# Patient Record
Sex: FEMALE | Race: WHITE | NOT HISPANIC OR LATINO | Employment: FULL TIME | URBAN - METROPOLITAN AREA
[De-identification: names, ages, dates, MRNs, and addresses within clinical notes are randomized per-mention and may not be internally consistent; named-entity substitution may affect disease eponyms.]

---

## 2021-11-03 ENCOUNTER — TELEPHONE (OUTPATIENT)
Dept: OBSTETRICS AND GYNECOLOGY | Facility: CLINIC | Age: 56
End: 2021-11-03
Payer: COMMERCIAL

## 2021-11-30 ENCOUNTER — OFFICE VISIT (OUTPATIENT)
Dept: OBSTETRICS AND GYNECOLOGY | Facility: CLINIC | Age: 56
End: 2021-11-30
Payer: COMMERCIAL

## 2021-11-30 VITALS
BODY MASS INDEX: 32 KG/M2 | SYSTOLIC BLOOD PRESSURE: 142 MMHG | HEART RATE: 80 BPM | HEIGHT: 66 IN | WEIGHT: 199.13 LBS | DIASTOLIC BLOOD PRESSURE: 86 MMHG

## 2021-11-30 DIAGNOSIS — Z01.419 ENCOUNTER FOR ANNUAL ROUTINE GYNECOLOGICAL EXAMINATION: Primary | ICD-10-CM

## 2021-11-30 DIAGNOSIS — N95.1 HOT FLASHES DUE TO MENOPAUSE: ICD-10-CM

## 2021-11-30 DIAGNOSIS — E34.9 HORMONE DEFICIENCY: ICD-10-CM

## 2021-11-30 DIAGNOSIS — N95.2 ATROPHIC VAGINITIS: ICD-10-CM

## 2021-11-30 PROCEDURE — 99386 PREV VISIT NEW AGE 40-64: CPT | Mod: S$GLB,,, | Performed by: NURSE PRACTITIONER

## 2021-11-30 PROCEDURE — 99386 PR PREVENTIVE VISIT,NEW,40-64: ICD-10-PCS | Mod: S$GLB,,, | Performed by: NURSE PRACTITIONER

## 2021-11-30 RX ORDER — MONTELUKAST SODIUM 10 MG/1
10 TABLET ORAL NIGHTLY
COMMUNITY
Start: 2021-10-27

## 2021-11-30 RX ORDER — ERGOCALCIFEROL 1.25 MG/1
50000 CAPSULE ORAL
COMMUNITY
Start: 2021-11-01

## 2021-11-30 RX ORDER — FLUTICASONE PROPIONATE 50 MCG
2 SPRAY, SUSPENSION (ML) NASAL DAILY
COMMUNITY
Start: 2021-09-17

## 2021-11-30 RX ORDER — TRAZODONE HYDROCHLORIDE 100 MG/1
100 TABLET ORAL NIGHTLY PRN
COMMUNITY
Start: 2021-11-01

## 2021-11-30 RX ORDER — CYCLOBENZAPRINE HCL 10 MG
10 TABLET ORAL NIGHTLY
COMMUNITY
Start: 2021-11-18

## 2022-02-28 ENCOUNTER — OFFICE VISIT (OUTPATIENT)
Dept: OBSTETRICS AND GYNECOLOGY | Facility: CLINIC | Age: 57
End: 2022-02-28
Payer: COMMERCIAL

## 2022-02-28 VITALS
BODY MASS INDEX: 33.17 KG/M2 | HEART RATE: 99 BPM | HEIGHT: 66 IN | WEIGHT: 206.38 LBS | DIASTOLIC BLOOD PRESSURE: 87 MMHG | SYSTOLIC BLOOD PRESSURE: 155 MMHG

## 2022-02-28 DIAGNOSIS — E34.9 HORMONE DEFICIENCY: Primary | ICD-10-CM

## 2022-02-28 DIAGNOSIS — K21.00 GASTROESOPHAGEAL REFLUX DISEASE WITH ESOPHAGITIS WITHOUT HEMORRHAGE: ICD-10-CM

## 2022-02-28 PROCEDURE — 3077F PR MOST RECENT SYSTOLIC BLOOD PRESSURE >= 140 MM HG: ICD-10-PCS | Mod: CPTII,S$GLB,, | Performed by: NURSE PRACTITIONER

## 2022-02-28 PROCEDURE — 99213 OFFICE O/P EST LOW 20 MIN: CPT | Mod: S$GLB,,, | Performed by: NURSE PRACTITIONER

## 2022-02-28 PROCEDURE — 1160F PR REVIEW ALL MEDS BY PRESCRIBER/CLIN PHARMACIST DOCUMENTED: ICD-10-PCS | Mod: CPTII,S$GLB,, | Performed by: NURSE PRACTITIONER

## 2022-02-28 PROCEDURE — 3079F DIAST BP 80-89 MM HG: CPT | Mod: CPTII,S$GLB,, | Performed by: NURSE PRACTITIONER

## 2022-02-28 PROCEDURE — 1160F RVW MEDS BY RX/DR IN RCRD: CPT | Mod: CPTII,S$GLB,, | Performed by: NURSE PRACTITIONER

## 2022-02-28 PROCEDURE — 3008F PR BODY MASS INDEX (BMI) DOCUMENTED: ICD-10-PCS | Mod: CPTII,S$GLB,, | Performed by: NURSE PRACTITIONER

## 2022-02-28 PROCEDURE — 3008F BODY MASS INDEX DOCD: CPT | Mod: CPTII,S$GLB,, | Performed by: NURSE PRACTITIONER

## 2022-02-28 PROCEDURE — 1159F PR MEDICATION LIST DOCUMENTED IN MEDICAL RECORD: ICD-10-PCS | Mod: CPTII,S$GLB,, | Performed by: NURSE PRACTITIONER

## 2022-02-28 PROCEDURE — 3077F SYST BP >= 140 MM HG: CPT | Mod: CPTII,S$GLB,, | Performed by: NURSE PRACTITIONER

## 2022-02-28 PROCEDURE — 3079F PR MOST RECENT DIASTOLIC BLOOD PRESSURE 80-89 MM HG: ICD-10-PCS | Mod: CPTII,S$GLB,, | Performed by: NURSE PRACTITIONER

## 2022-02-28 PROCEDURE — 99213 PR OFFICE/OUTPT VISIT, EST, LEVL III, 20-29 MIN: ICD-10-PCS | Mod: S$GLB,,, | Performed by: NURSE PRACTITIONER

## 2022-02-28 PROCEDURE — 1159F MED LIST DOCD IN RCRD: CPT | Mod: CPTII,S$GLB,, | Performed by: NURSE PRACTITIONER

## 2022-02-28 RX ORDER — SUCRALFATE 1 G/1
TABLET ORAL DAILY PRN
COMMUNITY
Start: 2022-02-10

## 2022-02-28 RX ORDER — ALBUTEROL SULFATE 90 UG/1
AEROSOL, METERED RESPIRATORY (INHALATION)
COMMUNITY
Start: 2022-01-21

## 2022-02-28 RX ORDER — SOD SULF/POT CHLORIDE/MAG SULF 1.479 G
TABLET ORAL
COMMUNITY
Start: 2021-11-03 | End: 2022-11-30

## 2022-02-28 RX ORDER — OMEPRAZOLE 40 MG/1
40 CAPSULE, DELAYED RELEASE ORAL DAILY
COMMUNITY
Start: 2022-02-10

## 2022-02-28 RX ORDER — ESTRADIOL 0.5 MG/1
0.5 TABLET ORAL DAILY
Qty: 30 TABLET | Refills: 11 | Status: SHIPPED | OUTPATIENT
Start: 2022-02-28 | End: 2023-02-28

## 2022-02-28 NOTE — PROGRESS NOTES
"  Subjective:       Patient ID: Licha Gonzales is a 56 y.o. female.    Chief Complaint:  Follow-up      History of Present Illness  Pt here for follow up on HRT. Pt did have a MMG at [facility] that required BX but was benign.   History and past labs reviewed with patient.    Complaints: Feeling better with the hot flashes and sleeping much better.       Review of Systems  Review of Systems   Genitourinary: Positive for decreased libido and hot flashes.   Neurological: Negative for headaches.           Objective:     Vitals:    02/28/22 0916   BP: (!) 155/87   Pulse: 99   Weight: 93.6 kg (206 lb 6.4 oz)   Height: 5' 6" (1.676 m)        Physical Exam:   Constitutional: She is oriented to person, place, and time.               Genitourinary:    Genitourinary Comments: Locate MMG and BX results for chart, change to estrace due to cost.                  Neurological: She is oriented to person, place, and time.     Psychiatric: She has a normal mood and affect. Her behavior is normal. Thought content normal.           Assessment:     1. Hormone deficiency    2. Gastroesophageal reflux disease with esophagitis without hemorrhage              Plan:       Hormone deficiency  -     estradioL (ESTRACE) 0.5 MG tablet; Take 1 tablet (0.5 mg total) by mouth once daily.  Dispense: 30 tablet; Refill: 11    Gastroesophageal reflux disease with esophagitis without hemorrhage         Follow up in about 9 months (around 11/28/2022).     "

## 2022-09-06 ENCOUNTER — TELEPHONE (OUTPATIENT)
Dept: ORTHOPEDICS | Facility: CLINIC | Age: 57
End: 2022-09-06
Payer: COMMERCIAL

## 2022-09-06 NOTE — TELEPHONE ENCOUNTER
Attempted to reach patient regarding program - no answer; detailed voicemail with call back number left for patient

## 2022-09-09 ENCOUNTER — TELEPHONE (OUTPATIENT)
Dept: ORTHOPEDICS | Facility: CLINIC | Age: 57
End: 2022-09-09
Payer: COMMERCIAL

## 2022-09-09 NOTE — TELEPHONE ENCOUNTER
----- Message from Shabnam Casas sent at 9/9/2022  2:40 PM CDT -----  Regarding: Follow-up on Initial Call  Brittany,     Spoke with Ms. Licha Gonzales, She has not had a dental cleaning in 4 years. I asked her to make a dental appointment to have her teeth cleaned and have the records faxed to us. I also asked if she has any skin issues. Ms. Gonzales states that her skin does get irritated with new products sometimes and she has to use Tide free, free of any dyes. Ms Gonzales also stated that she will schedule a dental cleaning.    -Patient

## 2022-10-20 ENCOUNTER — TELEPHONE (OUTPATIENT)
Dept: ORTHOPEDICS | Facility: CLINIC | Age: 57
End: 2022-10-20
Payer: COMMERCIAL

## 2022-11-08 ENCOUNTER — TELEPHONE (OUTPATIENT)
Dept: ORTHOPEDICS | Facility: CLINIC | Age: 57
End: 2022-11-08
Payer: COMMERCIAL

## 2022-11-08 NOTE — TELEPHONE ENCOUNTER
Patient left voicemail about combo fax being sent to PCP - it was sent on 10/20  Attempted to call their office to assure they had it; had to leave vcm   Emailed a copy of letter to patient     Updated patient with all of this information

## 2022-11-16 ENCOUNTER — TELEPHONE (OUTPATIENT)
Dept: ORTHOPEDICS | Facility: CLINIC | Age: 57
End: 2022-11-16
Payer: COMMERCIAL

## 2022-11-16 NOTE — TELEPHONE ENCOUNTER
Attempted to reach patient for status update   Rec'd office visit note and ekg from [facility]; did not send labs - those have been requested  On office visit note states she's still using nicotine - need to confirm if this is correct  Did get info from dentist, however, it is a billing ledger - unsure if tx is complete  Pt wasn't available and I left some info on vcm; requested she  call me back this evening

## 2022-11-22 ENCOUNTER — TELEPHONE (OUTPATIENT)
Dept: ORTHOPEDICS | Facility: CLINIC | Age: 57
End: 2022-11-22
Payer: COMMERCIAL

## 2022-11-22 NOTE — TELEPHONE ENCOUNTER
Patient called for status update   Still haven't gotten records from [facility].    Quite date for nicotine is 9/1  Dental treatment completed

## 2022-11-30 ENCOUNTER — TELEPHONE (OUTPATIENT)
Dept: ORTHOPEDICS | Facility: CLINIC | Age: 57
End: 2022-11-30
Payer: COMMERCIAL

## 2022-11-30 RX ORDER — ACETAMINOPHEN AND PHENYLEPHRINE HCL 325; 5 MG/1; MG/1
TABLET ORAL DAILY
COMMUNITY

## 2022-11-30 NOTE — TELEPHONE ENCOUNTER
Spoke to patient on: 11/30/22    Surgeon: Ho    Surgery: left Knee    Travel caregiver:  Ceasar     BMI: 33.4    Social Hx:     -  Nicotine no   - Alcohol no   - Other substances Rx marijuana              Patient works at [employer]            Plan to return home after surgery:  yes            Have support to help with care and appointments: yes     Allergies: udated in epic    Medication list: updated in epic    Covid-19:   - vaccine: yes   - diagnosed with: no        Health Hx: updated in epic    Surgical Hx: updated in epic    Can you take one flight of stairs: yes    RCRI:   - Coronary Artery Disease: no   - Congestive Heart Failure: no   - Cerebrovascular Disease: no   - Diabetes Mellitus on insulin: no      ROS:   - Fever/chills: no   - Infection: no   - Cough/Resp Issues:  no   - Bleeding/blood loss (vaginal, rectal, vomiting, urination):  no   - GI issues/Acid Reflux/Vomiting: yes   - Fatty liver: no   - UTI: no   - MRSA colonization: no   - Constipation:  no   - Recent steroid treatment: no   - Strokes or Passing out: no   - Blood Thinners or ASA: no   - Blood clot in heart/lung/extremity: no   - Stents: no   - Depression or Anxiety: no   - Skin issues (rashes, blisters, boils, bumps): yes   - Problems with anesthesia:  no   - If female, having periods/pregnant: No  - Vision problems: yes  - Dental exam recently: yes    STOPBANG   - S  snore loudly no   - T  tired during the day no   - O  stop breathing in sleep no   - P  BP/HTN no   - B  BMI    - A  over 50 yes   - N  neck size   - G  male gender no    Family Hx:    - Anesthesia problems: no   - Heart problems yes   - DVT  no

## 2022-12-12 ENCOUNTER — DOCUMENTATION ONLY (OUTPATIENT)
Dept: INTERNAL MEDICINE | Facility: CLINIC | Age: 57
End: 2022-12-12
Payer: COMMERCIAL

## 2022-12-12 DIAGNOSIS — Z79.890 HORMONE REPLACEMENT THERAPY (HRT): ICD-10-CM

## 2022-12-12 DIAGNOSIS — R76.8 ANA POSITIVE: ICD-10-CM

## 2022-12-12 DIAGNOSIS — Z87.891 FORMER SMOKER: ICD-10-CM

## 2022-12-12 DIAGNOSIS — R73.03 PREDIABETES: ICD-10-CM

## 2022-12-12 DIAGNOSIS — E66.9 OBESITY, UNSPECIFIED CLASSIFICATION, UNSPECIFIED OBESITY TYPE, UNSPECIFIED WHETHER SERIOUS COMORBIDITY PRESENT: ICD-10-CM

## 2022-12-12 DIAGNOSIS — Z01.89 LABORATORY TEST: ICD-10-CM

## 2022-12-12 DIAGNOSIS — K21.00 GASTROESOPHAGEAL REFLUX DISEASE WITH ESOPHAGITIS WITHOUT HEMORRHAGE: ICD-10-CM

## 2022-12-12 NOTE — PROGRESS NOTES
[facility]  Progress Note    Patient Name: Licha Gonzales  MRN: [id number]  Date of Evaluation- 12/12/2022  PCP- Clint Lindsey MD        HPI:    Assessment/Plan:     Former smoker  Quite date for nicotine is 9/1    Obesity  Weight related conditions         Acid reflux   Osteoarthritis  Prediabetes         Hormone replacement therapy (HRT)  On hormone replacement therapy   I  suggest reaching out to her prescribing MD with regards to holding hormone replacement therapy in the perioperative time due to risk of thrombosis    Laboratory test                      PLT-N    EKG personally reviewed by me showed no acute appearing changes    Prediabetes  Increase mobility after joint replacement surgery will likely help with the weight loss and  the prediabetes    Gastroesophageal reflux disease with esophagitis without hemorrhage      WAYNE positive  Had Work up through PCP  Rheumatoid factor within the reference range       Shannon Bird MD  Internal Medicine  [facility]   Cell Phone- [phone]    Once her blood pressure is acceptable, will give  the approval for surgery

## 2022-12-12 NOTE — ASSESSMENT & PLAN NOTE
Increase mobility after joint replacement surgery will likely help with the weight loss and  the prediabetes

## 2022-12-12 NOTE — HPI
Chart reviewed     Female , age 57 Y    Health conditions of significance for the perioperative period      - GERD  - Former smoker   - Obesity   - HRT

## 2022-12-12 NOTE — ASSESSMENT & PLAN NOTE
On hormone replacement therapy   I  suggest reaching out to her prescribing MD with regards to holding hormone replacement therapy in the perioperative time due to risk of thrombosis

## 2022-12-28 ENCOUNTER — TELEPHONE (OUTPATIENT)
Dept: ORTHOPEDICS | Facility: CLINIC | Age: 57
End: 2022-12-28
Payer: COMMERCIAL

## 2022-12-28 NOTE — TELEPHONE ENCOUNTER
Called pt on 12/21/22 with questions from Dr. Bird:    How is  she doing with her skin ? No issues   Any open areas on the expected surgical site or on that extremity ? No   Any infected skin lesions? No     I see that she is  on  hormone replacement therapy   I  suggest reaching out to her prescribing MD with regards to holding hormone replacement therapy in the perioperative time due to risk of thrombosis     It looks like she had the opposite knee replacement done about 12 years ago   She would have been about 45 years of age at that time   Why was it done at such a young age ? Torn ACL from accident at work; knee replacement after other options exhausted     I noted her blood pressure being high   Please ask for a 1 week blood pressure log , done when she is relaxed , once a day     Hemoglobin A1c in the prediabetic range   Increase mobility after joint replacement surgery will likely help with the weight loss and  the prediabetes     History of WAYNE positive   Had Work up through PCP   Does she have Rheumatoid arthritis ? No, not that she is aware of     Looking at her medication lives she, she probably has some allergies   How is  she doing with that ? Fine, no major issues     How is  she doing with her acid reflux ? Good, as long as she takes her omeprazole

## 2022-12-30 ENCOUNTER — PATIENT MESSAGE (OUTPATIENT)
Dept: ORTHOPEDICS | Facility: CLINIC | Age: 57
End: 2022-12-30
Payer: COMMERCIAL

## 2022-12-30 ENCOUNTER — TELEPHONE (OUTPATIENT)
Dept: ORTHOPEDICS | Facility: CLINIC | Age: 57
End: 2022-12-30
Payer: COMMERCIAL

## 2022-12-30 DIAGNOSIS — M17.12 PRIMARY OSTEOARTHRITIS OF LEFT KNEE: Primary | ICD-10-CM

## 2022-12-31 ENCOUNTER — DOCUMENTATION ONLY (OUTPATIENT)
Dept: INTERNAL MEDICINE | Facility: CLINIC | Age: 57
End: 2022-12-31
Payer: COMMERCIAL

## 2023-01-01 NOTE — PROGRESS NOTES
[facility] [address]  Progress Note    Patient Name: Licha Gonzales  MRN: [id number]  Date of Evaluation- 12/31/2022    Followed up     As per the correspondence from nurse-dated 12/21/2022    How is  she doing with her skin ? No issues   Any open areas on the expected surgical site or on that extremity ? No   Any infected skin lesions? No     She had the opposite knee replacement done about 12 years ago   She would have been about 45 years of age at that time   Why was it done at such a young age ? Torn ACL from accident at work; knee replacement after other options exhausted       Does she have Rheumatoid arthritis ? No, not that she is aware of     Allergies - Fine, no major issues     Acid reflux - Good, as long as she takes her omeprazole     Will give approval, once BP control is adequate   Will do virtual evaluation

## 2023-01-03 ENCOUNTER — TELEPHONE (OUTPATIENT)
Dept: ORTHOPEDICS | Facility: CLINIC | Age: 58
End: 2023-01-03
Payer: COMMERCIAL

## 2023-01-03 NOTE — TELEPHONE ENCOUNTER
Attempted to call pt re: upcoming surgery and BP log request. No answer. Left voicemail with callback #.

## 2023-01-12 ENCOUNTER — TELEPHONE (OUTPATIENT)
Dept: ORTHOPEDICS | Facility: CLINIC | Age: 58
End: 2023-01-12
Payer: COMMERCIAL

## 2023-01-12 NOTE — TELEPHONE ENCOUNTER
Called and spoke to pt. Let her know that we could potentially bump her surgery date up to beginning of February, just waiting on daily BP log x 1 week per Dr. Bird's request. Pt verbalized understanding and said she would start today and call me next week with BP's.

## 2023-01-18 ENCOUNTER — TELEPHONE (OUTPATIENT)
Dept: ORTHOPEDICS | Facility: CLINIC | Age: 58
End: 2023-01-18
Payer: COMMERCIAL

## 2023-01-20 ENCOUNTER — PATIENT MESSAGE (OUTPATIENT)
Dept: ORTHOPEDICS | Facility: CLINIC | Age: 58
End: 2023-01-20
Payer: COMMERCIAL

## 2023-01-23 ENCOUNTER — PATIENT MESSAGE (OUTPATIENT)
Dept: ADMINISTRATIVE | Facility: OTHER | Age: 58
End: 2023-01-23
Payer: COMMERCIAL

## 2023-01-23 ENCOUNTER — TELEPHONE (OUTPATIENT)
Dept: ORTHOPEDICS | Facility: CLINIC | Age: 58
End: 2023-01-23
Payer: COMMERCIAL

## 2023-01-23 NOTE — TELEPHONE ENCOUNTER
Contacted patient - wants to depart 2/20; wants to do OPPT - will call [employer] to find out where is in-network in [place]

## 2023-01-24 ENCOUNTER — TELEPHONE (OUTPATIENT)
Dept: ORTHOPEDICS | Facility: CLINIC | Age: 58
End: 2023-01-24
Payer: COMMERCIAL

## 2023-01-24 NOTE — TELEPHONE ENCOUNTER
Pt called to let us know name of PT in [place]:    [facility]  Willian Marquez, PT  [address] [place], [place], [address]   P [phone]  F [phone]

## 2023-01-25 ENCOUNTER — TELEPHONE (OUTPATIENT)
Dept: INTERNAL MEDICINE | Facility: CLINIC | Age: 58
End: 2023-01-25
Payer: COMMERCIAL

## 2023-01-25 DIAGNOSIS — J30.2 SEASONAL ALLERGIES: ICD-10-CM

## 2023-01-25 DIAGNOSIS — K21.00 GASTROESOPHAGEAL REFLUX DISEASE WITH ESOPHAGITIS WITHOUT HEMORRHAGE: ICD-10-CM

## 2023-01-25 DIAGNOSIS — E66.9 OBESITY, UNSPECIFIED CLASSIFICATION, UNSPECIFIED OBESITY TYPE, UNSPECIFIED WHETHER SERIOUS COMORBIDITY PRESENT: ICD-10-CM

## 2023-01-25 DIAGNOSIS — R73.03 PREDIABETES: ICD-10-CM

## 2023-01-25 DIAGNOSIS — R06.83 SNORING: ICD-10-CM

## 2023-01-25 DIAGNOSIS — R03.0 ELEVATED BP WITHOUT DIAGNOSIS OF HYPERTENSION: ICD-10-CM

## 2023-01-25 DIAGNOSIS — Z79.890 HORMONE REPLACEMENT THERAPY (HRT): ICD-10-CM

## 2023-01-25 DIAGNOSIS — R76.8 ANA POSITIVE: ICD-10-CM

## 2023-01-25 DIAGNOSIS — Z87.891 FORMER SMOKER: ICD-10-CM

## 2023-01-25 NOTE — ASSESSMENT & PLAN NOTE
She is not known to have rheumatoid arthritis  She does not have any problems with dropping things or problems with small objects like keys coins or buttons  No history of recurrent miscarriages   No history of pregnancy   Not known to have thyroid problems, vitiligo, pernicious anemia, type 1 diabetes  She is not known to have any autoimmune condition    Had Work up through PCP  Rheumatoid factor within the reference range

## 2023-01-25 NOTE — PROGRESS NOTES
[facility] [address]  Progress Note    Patient Name: Licha Gonzales  MRN: [id number]  Date of Evaluation- 01/25/2023  PCP- Clint Lindsey MD    Future cases for Licha Gonzales [[id number]]       Case ID Status Date Time Reynaldo Procedure Provider Location    [id number] [facility] 2/15/2023  1:05  ARTHROPLASTY, left KNEE, TOTAL Depuy-Attune NI Dr. Ho Teague III, MD [[id number]] [facility] OR            HPI:  History of present illness- I had the pleasure of meeting this pleasant 57 y.o. lady in the pre op clinic prior to her elective Orthopedic surgery. The patient is new to me .   I have offered to have her  on the phone during the consultation process     Virtual pre op evaluation   Audio only ( Technical difficulties )   Location of the patient- home, [place]      Each patient to whom he or she provides medical services by telemedicine is:  (1) informed of the relationship between the physician and patient and the respective role of any other health care provider with respect to management of the patient; and (2) notified that he or she may decline to receive medical services by telemedicine and may withdraw from such care at any time.    Chief complaint-Preoperative evaluation , Perioperative Medical management, complication reduction plan        I have obtained the history by speaking to the patient and by reviewing the electronic health records.    Events leading up to surgery / History of presenting illness -    She has been troubled with moderate-severe   Left knee pain for 1 -1.5 years  .   Pain increases with activity and decreases with resting.  She has stiffness of the left knee  She avoids taking Tylenol and anti-inflammatory medicines due to stomach upset, acid reflux  No previous left knee surgeries    Relevant health conditions of significance for the perioperative period/ History of presenting illness -    She has moved to [place] about 4 years ago   She was brought up in [place] and  had the opposite knee replacement done about 10 years ago in [place] -events leading up to right-sided knee replacement was that she was on a stepstool in her trying to reach something when she fell and hurt her right knee    Subjectively describes health as good     Health conditions of significance for the perioperative period       - Allergies  - HRT  - Acid reflux  - Elevated BP  - Obesity  - PCOS  - Hysterectomy     She lives with her  and a dog in a single-level house   Her  is going to help her after the surgery    She is having the surgery under the [facility] program   She is a [employer] employee   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       Review of Systems   Constitutional:  Negative for chills and fever.        No unusual weight changes     HENT:          STOPBANG score  / 8    Loud Snoring   Elevated BP   Age over 50        Eyes:         No new visual changes   Respiratory:          No cough , phlegm    No Hemoptysis   Cardiovascular:         As noted   Gastrointestinal:         No overt GI/ blood losses  Bowel movements- Regular    Endocrine:        Prednisone use > 20 mg daily for 3 weeks-None   Genitourinary:  Negative for dysuria.        No urinary hesitancy    Musculoskeletal:         As above      Skin:  Negative for rash.   Neurological:  Negative for syncope.        No unilateral weakness    Hematological:         Current use of Anticoagulants  None   No NSAID for pain 1 week pre op   Psychiatric/Behavioral:          No Depression,Anxiety   No vascular stenting     No past medical history pertinent negatives.        No anesthesia, bleeding, cardiac problems , PONVwith previous surgeries/procedures.  Medications and Allergies reviewed in epic.     FH- No bleeding / venous thrombosis ,  in family      Physical Exam  There were no vitals taken for this visit.      Investigations  Lab and Imaging have been reviewed in epic.      Review of old records- Was done and information gathered regards to events leading to surgery and health conditions of significance in the perioperative period.        Preoperative cardiac risk assessment-  The patient does not have any active cardiac conditions . Revised cardiac risk index predictors- 0---.Functional capacity is more than 4 Mets. She will be undergoing a Orthopedic procedure that carries a Moderate Risk risk     The estimated risk of the rate of adverse cardiac outcomes  3.9 %    No further cardiac work up is indicated prior to proceeding with the surgery     Orders Placed This Encounter    Ambulatory referral/consult to Smoking Cessation Program       American Society of Anesthesiologists Physical status classification ( ASA ) class: 2     Postoperative pulmonary complication risk assessment:         [place] Respiratory failure index- percentage risk of respiratory failure: 0.5 %     Assessment/Plan:     WAYNE positive  She is not known to have rheumatoid arthritis  She does not have any problems with dropping things or problems with small objects like keys coins or buttons  No history of recurrent miscarriages   No history of pregnancy   Not known to have thyroid problems, vitiligo, pernicious anemia, type 1 diabetes  She is not known to have any autoimmune condition    Had Work up through PCP  Rheumatoid factor within the reference range     Hormone  replacement therapy (HRT)  She has been commenced on hormone replacement therapy estradiol in February of 2022 for heart flashes, night sweats and difficulty sleeping    She had a hysterectomy and left-sided ovary removal -she had this done many years ago for polycystic ovarian syndrome and endometriosis    On hormone replacement therapy   Risk / benefits ofhormone replacement therpay   use in the perioperative period discussed   Risk of thrombosis discussed with hormone replacement therpay  use  Preferable to hold hormone replacement therpay 1-2 weeks pre op until  Mobility returns to base line   She choose to hold it 2 week before surgery    I have discussed that she will be receiving pharmacological venous thromboembolism prophylaxis and I suggested to stay off of the hormone replacement therapy until she completes the thromboembolism prophylaxis    Obesity  Her her height is 5 ft 6 in and weight is 192 lb   BMI 31.0     Weight related conditions     Known to have       Elevated BP  Acid reflux   Osteoarthritis    Not troubled with / Not known to have       Type 2 Diabetes   Hyperlipidemia   Sleep apnea   Fatty liver       Encouraged weight loss    Prediabetes      Hemoglobin A1c in the pre diabetic range   Weight loss with diet and exercise , if able helps lower A1c  Increased risk of becoming a diabetic   Needs follow up     Prediabetes- I suggest monitoring the glucose in the perioperative period as  glucose may be high from stress hyperglycemia in which case Insulin may be required      Gastroesophageal reflux disease with esophagitis without hemorrhage    Does not sound Cardiac   Tips to control reflux discussed     GERD-  I suggest continuation of the Proton pump inhibitor in the perioperative period . I suggest aspiration precautions    She tries to avoid acid take foods and soft drinks    Contributors    Weight   Coffee   Chocolate  Soda  Sleeping on the Stomach  Tobacco use    Former smoker  Quit Aug  2022  Smoked on and off      I suggested to consider stopping  smoking tobacco for its benefits in the lai operative period and in the long term    I  Informed about risk of wound healing problem ,infection,lung complications,thrombosis with tobacco use     Suggested quitting tobacco     Offered tobacco cessation service     Not known to have COPD,  Emphysema, wheezing , chronic phlegm   No inhaler, oxygen use     Breathing fine      Elevated BP without diagnosis of hypertension  It was felt that her elevated blood pressure could be from the left knee pain    Home BP- 120-130/75-86  Watches salt intake    Seasonal allergies  Doing good   She has allergies, sinus problem at that sometimes can lead to bronchitis   She recently seems to be doing fairly well with the allergies    Snoring    Possible sleep apnea- I suggest a sleep study and suggest caution with usage of medication that can cause respiratory suppression in the perioperative period  potential ramifications of untreated sleep apnea, which could include daytime sleepiness, hypertension, heart disease and stroke were discussed    Avoidance of  supine sleep, weight gain , alcoholic beverages , care with , sedative , CNS depressant use indicated  since all of these can worsen MANUEL     She deferred sleep evaluation until post op          Preventive perioperative care    Thromboembolic prophylaxis:  Her risk factors for thrombosis include surgical procedure and age.I suggest  thromboembolic prophylaxis ( mechanical/pharmacological, weighing the risk benefits of pharmacological agent use considering lai procedural bleeding )  during the perioperative period.I suggested being active in the post operative period.   The patient is a candidate for extended DVT prophylaxis   She feels that she can take ASA      Postoperative pulmonary complication prophylaxis-- I suggest incentive spirometry use, early ambulation, and end tidal carbon dioxide monitoring       Renal  complication prophylaxis- . I suggest keeping her well hydrated  in the perioperative period.     Surgical site Infection Prophylaxis-I  suggest appropriate antibiotic for Prophylaxis against Surgical site infections  No Staph infection history   Discussed skin antibacterial        In view of Regional anesthesia  the patient  is at risk of postoperative urinary retention.  I suggest avoidance / minimizing the of  Benzodiazepines,Anticholinergic medication,antihistamines ( Benadryl) , if possible in the perioperative period. I suggest using the minimum possible use of opioids for the minimum period of time in the perioperative period. Benadryl avoidance suggested      This visit was focused on Preoperative evaluation, Perioperative Medical management, complication reduction plans. I suggest that the patient follows up with primary care or relevant sub specialists for ongoing health care.    I appreciate the opportunity to be involved in this patients care. Please feel free to contact me if there were any questions about this consultation.    Patient is optimized    Shannon Bird MD  Perioperative Medicine  [facility]     Patient/ care giver/ Family member was instructed to call and update me about any changes to health,  medication, office visits ,testing out side of the lai operative care center , hospitalizations between now and surgery      COVID vaccine - 2  No History of COVID     COVID screening     No fever   No cough   No SOB  No sore throat   No loss of taste or smell   No muscle aches   No nausea, vomiting , diarrhea     Checked for OTC    1/25/2023    Started Premarin- expensive moved to Estrogen due to price    No dental issues     Breast mass - had biopsy - not concerning

## 2023-01-25 NOTE — ASSESSMENT & PLAN NOTE
She has been commenced on hormone replacement therapy estradiol in February of 2022 for heart flashes, night sweats and difficulty sleeping    She had a hysterectomy and left-sided ovary removal -she had this done many years ago for polycystic ovarian syndrome and endometriosis    On hormone replacement therapy   Risk / benefits ofhormone replacement therpay   use in the perioperative period discussed   Risk of thrombosis discussed with hormone replacement therpay  use  Preferable to hold hormone replacement therpay 1-2 weeks pre op until  Mobility returns to base line   She choose to hold it 2 week before surgery    I have discussed that she will be receiving pharmacological venous thromboembolism prophylaxis and I suggested to stay off of the hormone replacement therapy until she completes the thromboembolism prophylaxis

## 2023-01-25 NOTE — ASSESSMENT & PLAN NOTE
Hemoglobin A1c in the pre diabetic range   Weight loss with diet and exercise , if able helps lower A1c  Increased risk of becoming a diabetic   Needs follow up     Prediabetes- I suggest monitoring the glucose in the perioperative period as  glucose may be high from stress hyperglycemia in which case Insulin may be required

## 2023-01-25 NOTE — HPI
History of present illness- I had the pleasure of meeting this pleasant 57 y.o. lady in the pre op clinic prior to her elective Orthopedic surgery. The patient is new to me .   I have offered to have her  on the phone during the consultation process     Virtual pre op evaluation   Audio only ( Technical difficulties )   Location of the patient- [place], [place]      Each patient to whom he or she provides medical services by telemedicine is:  (1) informed of the relationship between the physician and patient and the respective role of any other health care provider with respect to management of the patient; and (2) notified that he or she may decline to receive medical services by telemedicine and may withdraw from such care at any time.    Chief complaint-Preoperative evaluation , Perioperative Medical management, complication reduction plan        I have obtained the history by speaking to the patient and by reviewing the electronic health records.    Events leading up to surgery / History of presenting illness -    She has been troubled with moderate-severe   Left knee pain for 1 -1.5 years  .   Pain increases with activity and decreases with resting.  She has stiffness of the left knee  She avoids taking Tylenol and anti-inflammatory medicines due to stomach upset, acid reflux  No previous left knee surgeries    Relevant health conditions of significance for the perioperative period/ History of presenting illness -    She has moved to [place] about 4 years ago   She was brought up in [place] and had the opposite knee replacement done about 10 years ago in [place] -events leading up to right-sided knee replacement was that she was on a stepstool in her trying to reach something when she fell and hurt her right knee    Subjectively describes health as good     Health conditions of significance for the perioperative period       - Allergies  - HRT  - Acid reflux  - Elevated BP  - Obesity  - PCOS  -  Hysterectomy     She lives with her  and a dog in a single-level house   Her  is going to help her after the surgery    She is having the surgery under the [facility]   She is a [employer] employee   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

## 2023-01-25 NOTE — ASSESSMENT & PLAN NOTE
Doing good   She has allergies, sinus problem at that sometimes can lead to bronchitis   She recently seems to be doing fairly well with the allergies

## 2023-01-25 NOTE — ASSESSMENT & PLAN NOTE
It was felt that her elevated blood pressure could be from the left knee pain    Home BP- 120-130/75-86  Watches salt intake

## 2023-01-25 NOTE — ASSESSMENT & PLAN NOTE
Her her height is 5 ft 6 in and weight is 192 lb   BMI 31.0     Weight related conditions     Known to have       Elevated BP  Acid reflux   Osteoarthritis    Not troubled with / Not known to have       Type 2 Diabetes   Hyperlipidemia   Sleep apnea   Fatty liver       Encouraged weight loss

## 2023-01-25 NOTE — ASSESSMENT & PLAN NOTE
Does not sound Cardiac   Tips to control reflux discussed     GERD-  I suggest continuation of the Proton pump inhibitor in the perioperative period . I suggest aspiration precautions    She tries to avoid acid take foods and soft drinks    Contributors    Weight   Coffee   Chocolate  Soda  Sleeping on the Stomach  Tobacco use

## 2023-01-25 NOTE — ASSESSMENT & PLAN NOTE
Possible sleep apnea- I suggest a sleep study and suggest caution with usage of medication that can cause respiratory suppression in the perioperative period  potential ramifications of untreated sleep apnea, which could include daytime sleepiness, hypertension, heart disease and stroke were discussed    Avoidance of  supine sleep, weight gain , alcoholic beverages , care with , sedative , CNS depressant use indicated  since all of these can worsen MANUEL     She deferred sleep evaluation until post op

## 2023-01-25 NOTE — ASSESSMENT & PLAN NOTE
Quit Aug 2022  Smoked on and off      I suggested to consider stopping  smoking tobacco for its benefits in the lai operative period and in the long term    I  Informed about risk of wound healing problem ,infection,lung complications,thrombosis with tobacco use     Suggested quitting tobacco     Offered tobacco cessation service     Not known to have COPD,  Emphysema, wheezing , chronic phlegm   No inhaler, oxygen use     Breathing fine

## 2023-01-25 NOTE — OUTPATIENT SUBJECTIVE & OBJECTIVE
Outpatient Subjective & Objective     Chief complaint-Preoperative evaluation, Perioperative Medical management, complication reduction plan     Active cardiac conditions- none    Revised cardiac risk index predictors- none    Functional capacity -Examples of physical activity, active person,  can take 1 flight of stairs----- She can undertake all the above activities without  chest pain,chest tightness, Shortness of breath ,dizziness,lightheadedness making her exercise tolerance more,  than 4 Mets.       Review of Systems   Constitutional:  Negative for chills and fever.        No unusual weight changes     HENT:          STOPBANG score  / 8    Loud Snoring   Elevated BP   Age over 50        Eyes:         No new visual changes   Respiratory:          No cough , phlegm    No Hemoptysis   Cardiovascular:         As noted   Gastrointestinal:         No overt GI/ blood losses  Bowel movements- Regular    Endocrine:        Prednisone use > 20 mg daily for 3 weeks-None   Genitourinary:  Negative for dysuria.        No urinary hesitancy    Musculoskeletal:         As above      Skin:  Negative for rash.   Neurological:  Negative for syncope.        No unilateral weakness   Hematological:         Current use of Anticoagulants  None   No NSAID for pain 1 week pre op   Psychiatric/Behavioral:          No Depression,Anxiety   No vascular stenting     No past medical history pertinent negatives.        No anesthesia, bleeding, cardiac problems , PONVwith previous surgeries/procedures.  Medications and Allergies reviewed in epic.     FH- No bleeding / venous thrombosis ,  in family      Physical Exam  There were no vitals taken for this visit.      Investigations  Lab and Imaging have been reviewed in epic.      Review of old records- Was done and information gathered regards to events leading to surgery and health conditions of significance in the perioperative period.    Outpatient Subjective & Objective

## 2023-02-01 ENCOUNTER — PATIENT MESSAGE (OUTPATIENT)
Dept: ORTHOPEDICS | Facility: CLINIC | Age: 58
End: 2023-02-01
Payer: COMMERCIAL

## 2023-02-07 ENCOUNTER — PATIENT MESSAGE (OUTPATIENT)
Dept: ADMINISTRATIVE | Facility: OTHER | Age: 58
End: 2023-02-07
Payer: COMMERCIAL

## 2023-02-08 ENCOUNTER — PATIENT MESSAGE (OUTPATIENT)
Dept: ADMINISTRATIVE | Facility: OTHER | Age: 58
End: 2023-02-08
Payer: COMMERCIAL

## 2023-02-13 DIAGNOSIS — R52 PAIN: ICD-10-CM

## 2023-02-13 DIAGNOSIS — Z01.818 PREOP TESTING: Primary | ICD-10-CM

## 2023-02-14 ENCOUNTER — HOSPITAL ENCOUNTER (OUTPATIENT)
Dept: RADIOLOGY | Facility: HOSPITAL | Age: 58
Discharge: HOME | End: 2023-02-14
Attending: ORTHOPAEDIC SURGERY
Payer: COMMERCIAL

## 2023-02-14 ENCOUNTER — OFFICE VISIT (OUTPATIENT)
Dept: ORTHOPEDICS | Facility: CLINIC | Age: 58
End: 2023-02-14
Payer: COMMERCIAL

## 2023-02-14 ENCOUNTER — HOSPITAL ENCOUNTER (OUTPATIENT)
Dept: CARDIOLOGY | Facility: CLINIC | Age: 58
Discharge: HOME | End: 2023-02-14
Payer: COMMERCIAL

## 2023-02-14 ENCOUNTER — TELEPHONE (OUTPATIENT)
Dept: PHARMACY | Facility: CLINIC | Age: 58
End: 2023-02-14
Payer: COMMERCIAL

## 2023-02-14 ENCOUNTER — HOSPITAL ENCOUNTER (OUTPATIENT)
Dept: PREADMISSION TESTING | Facility: HOSPITAL | Age: 58
Discharge: HOME | End: 2023-02-14
Attending: ORTHOPAEDIC SURGERY
Payer: COMMERCIAL

## 2023-02-14 ENCOUNTER — OFFICE VISIT (OUTPATIENT)
Dept: INTERNAL MEDICINE | Facility: CLINIC | Age: 58
End: 2023-02-14
Payer: COMMERCIAL

## 2023-02-14 ENCOUNTER — HOSPITAL ENCOUNTER (OUTPATIENT)
Dept: RADIOLOGY | Facility: HOSPITAL | Age: 58
Discharge: HOME | End: 2023-02-14
Attending: ANESTHESIOLOGY
Payer: COMMERCIAL

## 2023-02-14 ENCOUNTER — DOCUMENTATION ONLY (OUTPATIENT)
Dept: ALLERGY | Facility: CLINIC | Age: 58
End: 2023-02-14
Payer: COMMERCIAL

## 2023-02-14 VITALS
OXYGEN SATURATION: 97 % | TEMPERATURE: 98 F | WEIGHT: 215 LBS | HEIGHT: 65 IN | SYSTOLIC BLOOD PRESSURE: 138 MMHG | DIASTOLIC BLOOD PRESSURE: 76 MMHG | BODY MASS INDEX: 35.82 KG/M2 | HEART RATE: 83 BPM

## 2023-02-14 VITALS — WEIGHT: 215 LBS | BODY MASS INDEX: 35.82 KG/M2 | HEIGHT: 65 IN

## 2023-02-14 VITALS — BODY MASS INDEX: 35.82 KG/M2 | WEIGHT: 215 LBS | HEIGHT: 65 IN

## 2023-02-14 DIAGNOSIS — K58.9 IRRITABLE BOWEL SYNDROME, UNSPECIFIED TYPE: ICD-10-CM

## 2023-02-14 DIAGNOSIS — E66.9 OBESITY, UNSPECIFIED CLASSIFICATION, UNSPECIFIED OBESITY TYPE, UNSPECIFIED WHETHER SERIOUS COMORBIDITY PRESENT: ICD-10-CM

## 2023-02-14 DIAGNOSIS — Z79.890 HORMONE REPLACEMENT THERAPY (HRT): ICD-10-CM

## 2023-02-14 DIAGNOSIS — K21.00 GASTROESOPHAGEAL REFLUX DISEASE WITH ESOPHAGITIS WITHOUT HEMORRHAGE: ICD-10-CM

## 2023-02-14 DIAGNOSIS — Z01.89 LABORATORY TEST: ICD-10-CM

## 2023-02-14 DIAGNOSIS — M17.12 PRIMARY OSTEOARTHRITIS OF LEFT KNEE: Primary | ICD-10-CM

## 2023-02-14 DIAGNOSIS — Z96.652 STATUS POST LEFT KNEE REPLACEMENT: Primary | ICD-10-CM

## 2023-02-14 DIAGNOSIS — M17.12 PRIMARY OSTEOARTHRITIS OF LEFT KNEE: ICD-10-CM

## 2023-02-14 DIAGNOSIS — Z87.891 FORMER SMOKER: ICD-10-CM

## 2023-02-14 DIAGNOSIS — R06.83 SNORING: ICD-10-CM

## 2023-02-14 DIAGNOSIS — R03.0 ELEVATED BP WITHOUT DIAGNOSIS OF HYPERTENSION: ICD-10-CM

## 2023-02-14 DIAGNOSIS — R52 PAIN: ICD-10-CM

## 2023-02-14 DIAGNOSIS — J30.2 SEASONAL ALLERGIES: ICD-10-CM

## 2023-02-14 DIAGNOSIS — Z01.818 PREOP TESTING: ICD-10-CM

## 2023-02-14 DIAGNOSIS — R73.03 PREDIABETES: ICD-10-CM

## 2023-02-14 DIAGNOSIS — R76.8 ANA POSITIVE: ICD-10-CM

## 2023-02-14 PROCEDURE — 71046 XR CHEST PA AND LATERAL: ICD-10-PCS | Mod: 26,COE,, | Performed by: RADIOLOGY

## 2023-02-14 PROCEDURE — 99999 PR PBB SHADOW E&M-EST. PATIENT-LVL III: CPT | Mod: PBBFAC,COE,, | Performed by: ORTHOPAEDIC SURGERY

## 2023-02-14 PROCEDURE — 93010 EKG 12-LEAD: ICD-10-PCS | Mod: S$GLB,COE,, | Performed by: INTERNAL MEDICINE

## 2023-02-14 PROCEDURE — 99204 OFFICE O/P NEW MOD 45 MIN: CPT | Mod: 57,S$GLB,COE, | Performed by: ORTHOPAEDIC SURGERY

## 2023-02-14 PROCEDURE — 93005 ELECTROCARDIOGRAM TRACING: CPT | Mod: S$GLB,COE,, | Performed by: ANESTHESIOLOGY

## 2023-02-14 PROCEDURE — 73562 XR KNEE ORTHO LEFT: ICD-10-PCS | Mod: 26,LT,COE, | Performed by: RADIOLOGY

## 2023-02-14 PROCEDURE — 99999 PR PBB SHADOW E&M-EST. PATIENT-LVL IV: CPT | Mod: PBBFAC,COE,, | Performed by: NURSE PRACTITIONER

## 2023-02-14 PROCEDURE — 99999 PR PBB SHADOW E&M-EST. PATIENT-LVL II: CPT | Mod: PBBFAC,COE,, | Performed by: HOSPITALIST

## 2023-02-14 PROCEDURE — 99999 PR PBB SHADOW E&M-EST. PATIENT-LVL III: ICD-10-PCS | Mod: PBBFAC,COE,, | Performed by: ORTHOPAEDIC SURGERY

## 2023-02-14 PROCEDURE — 71046 X-RAY EXAM CHEST 2 VIEWS: CPT | Mod: TC,FY

## 2023-02-14 PROCEDURE — 73560 XR KNEE ORTHO LEFT: ICD-10-PCS | Mod: 26,RT,COE, | Performed by: RADIOLOGY

## 2023-02-14 PROCEDURE — 73560 X-RAY EXAM OF KNEE 1 OR 2: CPT | Mod: TC,RT

## 2023-02-14 PROCEDURE — 99214 OFFICE O/P EST MOD 30 MIN: CPT | Mod: S$GLB,COE,, | Performed by: HOSPITALIST

## 2023-02-14 PROCEDURE — 99499 NO LOS: ICD-10-PCS | Mod: S$GLB,COE,, | Performed by: NURSE PRACTITIONER

## 2023-02-14 PROCEDURE — 93005 EKG 12-LEAD: ICD-10-PCS | Mod: S$GLB,COE,, | Performed by: ANESTHESIOLOGY

## 2023-02-14 PROCEDURE — 99999 PR PBB SHADOW E&M-EST. PATIENT-LVL IV: ICD-10-PCS | Mod: PBBFAC,COE,, | Performed by: NURSE PRACTITIONER

## 2023-02-14 PROCEDURE — 99214 PR OFFICE/OUTPT VISIT, EST, LEVL IV, 30-39 MIN: ICD-10-PCS | Mod: S$GLB,COE,, | Performed by: HOSPITALIST

## 2023-02-14 PROCEDURE — 99204 PR OFFICE/OUTPT VISIT, NEW, LEVL IV, 45-59 MIN: ICD-10-PCS | Mod: 57,S$GLB,COE, | Performed by: ORTHOPAEDIC SURGERY

## 2023-02-14 PROCEDURE — 99999 PR PBB SHADOW E&M-EST. PATIENT-LVL II: ICD-10-PCS | Mod: PBBFAC,COE,, | Performed by: HOSPITALIST

## 2023-02-14 PROCEDURE — 93010 ELECTROCARDIOGRAM REPORT: CPT | Mod: S$GLB,COE,, | Performed by: INTERNAL MEDICINE

## 2023-02-14 PROCEDURE — 99499 UNLISTED E&M SERVICE: CPT | Mod: S$GLB,COE,, | Performed by: NURSE PRACTITIONER

## 2023-02-14 PROCEDURE — 73562 X-RAY EXAM OF KNEE 3: CPT | Mod: 26,LT,COE, | Performed by: RADIOLOGY

## 2023-02-14 PROCEDURE — 71046 X-RAY EXAM CHEST 2 VIEWS: CPT | Mod: 26,COE,, | Performed by: RADIOLOGY

## 2023-02-14 PROCEDURE — 73560 X-RAY EXAM OF KNEE 1 OR 2: CPT | Mod: 26,RT,COE, | Performed by: RADIOLOGY

## 2023-02-14 RX ORDER — CELECOXIB 200 MG/1
200 CAPSULE ORAL DAILY
Status: CANCELLED | OUTPATIENT
Start: 2023-02-14

## 2023-02-14 RX ORDER — MIDAZOLAM HYDROCHLORIDE 1 MG/ML
4 INJECTION INTRAMUSCULAR; INTRAVENOUS
Status: CANCELLED | OUTPATIENT
Start: 2023-02-14 | End: 2023-02-15

## 2023-02-14 RX ORDER — OXYCODONE HYDROCHLORIDE 5 MG/1
10 TABLET ORAL
Status: CANCELLED | OUTPATIENT
Start: 2023-02-14

## 2023-02-14 RX ORDER — POLYETHYLENE GLYCOL 3350 17 G/17G
17 POWDER, FOR SOLUTION ORAL DAILY
Status: CANCELLED | OUTPATIENT
Start: 2023-02-14

## 2023-02-14 RX ORDER — ASPIRIN 81 MG/1
81 TABLET ORAL 2 TIMES DAILY
Status: CANCELLED | OUTPATIENT
Start: 2023-02-14

## 2023-02-14 RX ORDER — NALOXONE HCL 0.4 MG/ML
0.02 VIAL (ML) INJECTION
Status: CANCELLED | OUTPATIENT
Start: 2023-02-14

## 2023-02-14 RX ORDER — MORPHINE SULFATE 2 MG/ML
2 INJECTION, SOLUTION INTRAMUSCULAR; INTRAVENOUS
Status: CANCELLED | OUTPATIENT
Start: 2023-02-14

## 2023-02-14 RX ORDER — ONDANSETRON 2 MG/ML
4 INJECTION INTRAMUSCULAR; INTRAVENOUS EVERY 8 HOURS PRN
Status: CANCELLED | OUTPATIENT
Start: 2023-02-14

## 2023-02-14 RX ORDER — OXYCODONE HYDROCHLORIDE 5 MG/1
5 TABLET ORAL
Status: CANCELLED | OUTPATIENT
Start: 2023-02-14

## 2023-02-14 RX ORDER — TALC
6 POWDER (GRAM) TOPICAL NIGHTLY PRN
Status: CANCELLED | OUTPATIENT
Start: 2023-02-14

## 2023-02-14 RX ORDER — ERGOCALCIFEROL 1.25 MG/1
50000 CAPSULE ORAL
Qty: 8 CAPSULE | Refills: 0 | Status: SHIPPED | OUTPATIENT
Start: 2023-02-14

## 2023-02-14 RX ORDER — LIDOCAINE HYDROCHLORIDE 10 MG/ML
1 INJECTION, SOLUTION EPIDURAL; INFILTRATION; INTRACAUDAL; PERINEURAL
Status: CANCELLED | OUTPATIENT
Start: 2023-02-14

## 2023-02-14 RX ORDER — MUPIROCIN 20 MG/G
1 OINTMENT TOPICAL
Status: CANCELLED | OUTPATIENT
Start: 2023-02-14

## 2023-02-14 RX ORDER — CELECOXIB 200 MG/1
200 CAPSULE ORAL DAILY
Qty: 30 CAPSULE | Refills: 0 | Status: ON HOLD | OUTPATIENT
Start: 2023-02-14 | End: 2023-02-15 | Stop reason: ALTCHOICE

## 2023-02-14 RX ORDER — SODIUM CHLORIDE 0.9 % (FLUSH) 0.9 %
10 SYRINGE (ML) INJECTION
Status: CANCELLED | OUTPATIENT
Start: 2023-02-14

## 2023-02-14 RX ORDER — BISACODYL 10 MG
10 SUPPOSITORY, RECTAL RECTAL EVERY 12 HOURS PRN
Status: CANCELLED | OUTPATIENT
Start: 2023-02-14

## 2023-02-14 RX ORDER — OXYCODONE HYDROCHLORIDE 5 MG/1
TABLET ORAL
Qty: 50 TABLET | Refills: 0 | Status: SHIPPED | OUTPATIENT
Start: 2023-02-14 | End: 2023-02-20 | Stop reason: SDUPTHER

## 2023-02-14 RX ORDER — MUPIROCIN 20 MG/G
1 OINTMENT TOPICAL 2 TIMES DAILY
Status: CANCELLED | OUTPATIENT
Start: 2023-02-14 | End: 2023-02-19

## 2023-02-14 RX ORDER — ACETAMINOPHEN 500 MG
1000 TABLET ORAL
Status: CANCELLED | OUTPATIENT
Start: 2023-02-14

## 2023-02-14 RX ORDER — FENTANYL CITRATE 50 UG/ML
100 INJECTION, SOLUTION INTRAMUSCULAR; INTRAVENOUS
Status: CANCELLED | OUTPATIENT
Start: 2023-02-14 | End: 2023-02-15

## 2023-02-14 RX ORDER — ACETAMINOPHEN 500 MG
1000 TABLET ORAL EVERY 6 HOURS
Status: CANCELLED | OUTPATIENT
Start: 2023-02-14

## 2023-02-14 RX ORDER — DEXTROMETHORPHAN HYDROBROMIDE, GUAIFENESIN 5; 100 MG/5ML; MG/5ML
650 LIQUID ORAL EVERY 8 HOURS
Qty: 120 TABLET | Refills: 0 | Status: ON HOLD | OUTPATIENT
Start: 2023-02-14 | End: 2023-02-15 | Stop reason: ALTCHOICE

## 2023-02-14 RX ORDER — METHOCARBAMOL 750 MG/1
750 TABLET, FILM COATED ORAL 3 TIMES DAILY
Status: CANCELLED | OUTPATIENT
Start: 2023-02-14

## 2023-02-14 RX ORDER — DOCUSATE SODIUM 100 MG/1
100 CAPSULE, LIQUID FILLED ORAL 2 TIMES DAILY
Qty: 60 CAPSULE | Refills: 0 | Status: SHIPPED | OUTPATIENT
Start: 2023-02-14

## 2023-02-14 RX ORDER — SODIUM CHLORIDE 9 MG/ML
INJECTION, SOLUTION INTRAVENOUS
Status: CANCELLED | OUTPATIENT
Start: 2023-02-14

## 2023-02-14 RX ORDER — FENTANYL CITRATE 50 UG/ML
25 INJECTION, SOLUTION INTRAMUSCULAR; INTRAVENOUS EVERY 5 MIN PRN
Status: CANCELLED | OUTPATIENT
Start: 2023-02-14

## 2023-02-14 RX ORDER — SODIUM CHLORIDE 9 MG/ML
INJECTION, SOLUTION INTRAVENOUS CONTINUOUS
Status: CANCELLED | OUTPATIENT
Start: 2023-02-14 | End: 2023-02-15

## 2023-02-14 RX ORDER — AMOXICILLIN 250 MG
1 CAPSULE ORAL 2 TIMES DAILY
Status: CANCELLED | OUTPATIENT
Start: 2023-02-14

## 2023-02-14 RX ORDER — FAMOTIDINE 20 MG/1
20 TABLET, FILM COATED ORAL 2 TIMES DAILY
Status: CANCELLED | OUTPATIENT
Start: 2023-02-14

## 2023-02-14 RX ORDER — CELECOXIB 200 MG/1
400 CAPSULE ORAL ONCE
Status: CANCELLED | OUTPATIENT
Start: 2023-02-14 | End: 2023-02-14

## 2023-02-14 RX ORDER — ASPIRIN 81 MG/1
81 TABLET ORAL 2 TIMES DAILY
Qty: 60 TABLET | Refills: 0 | Status: ON HOLD | OUTPATIENT
Start: 2023-02-14 | End: 2023-02-15 | Stop reason: ALTCHOICE

## 2023-02-14 RX ORDER — PREGABALIN 75 MG/1
75 CAPSULE ORAL
Status: CANCELLED | OUTPATIENT
Start: 2023-02-14

## 2023-02-14 RX ORDER — PROCHLORPERAZINE EDISYLATE 5 MG/ML
5 INJECTION INTRAMUSCULAR; INTRAVENOUS EVERY 6 HOURS PRN
Status: CANCELLED | OUTPATIENT
Start: 2023-02-14

## 2023-02-14 RX ORDER — METHOCARBAMOL 750 MG/1
750 TABLET, FILM COATED ORAL 4 TIMES DAILY PRN
Qty: 40 TABLET | Refills: 0 | Status: SHIPPED | OUTPATIENT
Start: 2023-02-14 | End: 2023-02-26

## 2023-02-14 RX ORDER — PREGABALIN 75 MG/1
75 CAPSULE ORAL NIGHTLY
Status: CANCELLED | OUTPATIENT
Start: 2023-02-14

## 2023-02-14 NOTE — H&P
CC:  Left knee pain    Licha Gonzales is a 57 y.o. female with history of Left knee pain. Pain is worse with activity and weight bearing.  Patient has experienced interference of activities of daily living due to decreased range of motion and an increase in joint pain and swelling.  Patient has failed non-operative treatment including NSAIDs, corticosteroid injections, viscosupplement injections, and activity modification.  Licha Gonzales currently ambulates independently.     Relevant medical conditions of significance in perioperative period:  History or right knee replacement  GERD- on prilosec     Given documented medical comorbidities including those listed above and based off of CMS criteria patient would meet inpatient admission status guidelines. This case has been approved as inpatient.     Past Medical History:   Diagnosis Date    Arthritis     GERD (gastroesophageal reflux disease)     IBS (irritable bowel syndrome)        Past Surgical History:   Procedure Laterality Date    HAND SURGERY Bilateral     Carpal tunnel surgery - both sides    HYSTERECTOMY      JOINT REPLACEMENT      left shoulder  2016    NOSE SURGERY      x's 2    TOTAL KNEE ARTHROPLASTY Right 2012       Family History   Problem Relation Age of Onset    Osteoarthritis Mother     Osteoarthritis Maternal Grandmother     Diabetes Maternal Grandmother     Heart disease Maternal Grandmother     Diabetes Paternal Grandmother     Heart disease Maternal Uncle        Review of patient's allergies indicates:   Allergen Reactions    Biaxin [clarithromycin] Rash         Current Outpatient Medications:     albuterol (PROVENTIL/VENTOLIN HFA) 90 mcg/actuation inhaler, INHALE 1 TO 2 PUFFS BY MOUTH EVERY 4 TO 6 HOURS, Disp: , Rfl:     biotin 10,000 mcg Cap, Take by mouth once daily., Disp: , Rfl:     cyclobenzaprine (FLEXERIL) 10 MG tablet, Take 10 mg by mouth every evening., Disp: , Rfl:     ergocalciferol (ERGOCALCIFEROL) 50,000 unit Cap, Take 50,000  Units by mouth every 7 days. Tuesday, Disp: , Rfl:     estradioL (ESTRACE) 0.5 MG tablet, Take 1 tablet (0.5 mg total) by mouth once daily., Disp: 30 tablet, Rfl: 11    fluticasone propionate (FLONASE) 50 mcg/actuation nasal spray, 2 sprays by Each Nostril route once daily. Shake Liquid and use As needed, Disp: , Rfl:     montelukast (SINGULAIR) 10 mg tablet, Take 10 mg by mouth every evening., Disp: , Rfl:     omeprazole (PRILOSEC) 40 MG capsule, Take 40 mg by mouth once daily., Disp: , Rfl:     pseudoephedrine HCl (SUDAFED ORAL), Take by mouth as needed. Allergies, Disp: , Rfl:     sucralfate (CARAFATE) 1 gram tablet, Take by mouth daily as needed. As needed, Disp: , Rfl:     traZODone (DESYREL) 100 MG tablet, Take 100 mg by mouth nightly as needed for Insomnia., Disp: , Rfl:     acetaminophen (TYLENOL) 650 MG TbSR, Take 1 tablet (650 mg total) by mouth every 8 (eight) hours., Disp: 120 tablet, Rfl: 0    aspirin (ECOTRIN) 81 MG EC tablet, Take 1 tablet (81 mg total) by mouth 2 (two) times a day., Disp: 60 tablet, Rfl: 0    celecoxib (CELEBREX) 200 MG capsule, Take 1 capsule (200 mg total) by mouth once daily., Disp: 30 capsule, Rfl: 0    docusate sodium (COLACE) 100 MG capsule, Take 1 capsule (100 mg total) by mouth 2 (two) times daily., Disp: 60 capsule, Rfl: 0    ergocalciferol (ERGOCALCIFEROL) 50,000 unit Cap, Take 1 capsule (50,000 Units total) by mouth every 7 days. For 8 weeks, Disp: 8 capsule, Rfl: 0    methocarbamoL (ROBAXIN) 750 MG Tab, Take 1 tablet (750 mg total) by mouth 4 (four) times daily as needed (muscle spasms)., Disp: 40 tablet, Rfl: 0    oxyCODONE (ROXICODONE) 5 MG immediate release tablet, Take 1-2 tablets by mouth every 4-6 hours as needed for pain, Disp: 50 tablet, Rfl: 0    Review of Systems:  Constitutional: no fever or chills  Eyes: no visual changes  ENT: no nasal congestion or sore throat  Respiratory: no cough or shortness of breath  Cardiovascular: no chest pain or  "palpitations  Gastrointestinal: no nausea or vomiting, tolerating diet  Genitourinary: no hematuria or dysuria  Integument/Breast: no rash or pruritis  Hematologic/Lymphatic: no easy bruising or lymphadenopathy  Musculoskeletal: positive for knee pain  Neurological: no seizures or tremors  Behavioral/Psych: no auditory or visual hallucinations  Endocrine: no heat or cold intolerance    PE:  Ht 5' 5" (1.651 m)   Wt 97.5 kg (215 lb)   BMI 35.78 kg/m²   General: Pleasant, cooperative, NAD   Gait: antalgic  HEENT: NCAT, sclera nonicteric   Lungs: Respirations clear bilaterally; equal and unlabored.   CV: S1S2; 2+ bilateral upper and lower extremity pulses.   Skin: Intact throughout with no rashes, erythema, or lesions  Extremities: No LE edema,  no erythema or warmth of the skin in either lower extremity.    Left knee exam:  Knee Range of Motion:90 degrees flexion, pain with passive range of motion  Effusion:none  Condition of skin:intact  Location of tenderness:Medial joint line   Strength:normal  Stability: stable to testing    Hip Examination: painless PROM of hip     Radiographs: Radiographs reveal advanced degenerative changes including subchondral cyst formation, subchondral sclerosis, osteophyte formation, joint space narrowing.     Knee Alignment:  Mild varus    Diagnosis: Primary osteoarthritis Left knee    Plan: Left total knee arthroplasty    Due to the serious nature of total joint infection and the high prevalence of community acquired MRSA, vancomycin will be used perioperatively.            "

## 2023-02-14 NOTE — ASSESSMENT & PLAN NOTE
PROCEDURE NOTE: Avastin () OS. Diagnosis: Neovascular AMD with Inactive CNV. Anesthesia: Topical. Prep: Betadine Drops. Prior to injection, risks/benefits/alternatives discussed including infection, loss of vision, hemorrhage, cataract, glaucoma, retinal tears or detachment. The off-label status of Intravitreal Avastin also was reviewed. The patient wished to proceed with treatment. Topical anesthesia was induced with Alcaine. Additional anesthesia was achieved using drop(s) or injection checked above. a drop of Povidone-iodine 5% ophthalmic solution was instilled over the injection site and in the inferior fornix. Betadine prep was performed. Using the syringe provided, Avastin 1.25 mg in 0.05 cc was injected into the vitreous cavity. The needle was passed 3.0 mm posterior to the limbus in pseudophakic patients, and 3.5 mm posterior to the lumbus in phakic patients. The remainder of the Avastin in the single-use vial was then discarded in a medical waste disposal container. Unused medication was discarded. Patient tolerated procedure well. There were no complications. Injection time: 2727. Post-op instructions given. Expiration Date: 6327-93-32G95:00:00. The patient was instructed to return for re-evaluation in approximately 4-12 weeks depending on his/her condition and was told to call immediately if vision decreases and/or if his/her eye becomes red, painful, and/or light sensitive. The patient was instructed to go to the emergency room or call 911 if unable to reach the doctor within an hour or two of trying or calling. The patient was instructed to use Artificial Tears q.i.d. p.r.n for comfort. Harper Menendez Quit Aug 2022  Smoked on and off       I suggested to consider stopping  smoking tobacco for its benefits in the lai operative period and in the long term     I  Informed about risk of wound healing problem ,infection,lung complications,thrombosis with tobacco use      Suggested quitting tobacco       Not known to have COPD,  Emphysema,  , chronic phlegm   No oxygen use      Breathing fine    Since he quit tobacco she has gained some weight  Reduced mobility might also contributed to the weight

## 2023-02-14 NOTE — ASSESSMENT & PLAN NOTE
It was felt that her elevated blood pressure could be from the left knee pain     Home BP- 120-130/75-86  Watches salt intake    With increased mobility after joint replacement surgery, her blood pressure will likely improve

## 2023-02-14 NOTE — ASSESSMENT & PLAN NOTE
Possible sleep apnea- I suggest a sleep study and suggest caution with usage of medication that can cause respiratory suppression in the perioperative period  potential ramifications of untreated sleep apnea, which could include daytime sleepiness, hypertension, heart disease and stroke were discussed     Avoidance of  supine sleep, weight gain , alcoholic beverages , care with , sedative , CNS depressant use indicated  since all of these can worsen MANUEL      She deferred sleep evaluation until post op    With increased mobility, weight loss her snoring might be better after joint replacement surgery

## 2023-02-14 NOTE — PROGRESS NOTES
Subjective:     HPI:   Licha Gonzales is a 57 y.o. female who presents for eval L knee OA, NI TKA    She is had years of progressive left knee pain and stiffness worse over the past year.  Global in nature predominantly medial and posterior activity related and relieved with rest.  She is been waiting since August for surgery    She would a right total knee replacement in 2010 in [facility].  She denies any perioperative complications her wound healing issues but she grew scar tissue she had a manipulation under anesthesia postoperatively that did help overall she is happy with her knee but it is still stiff.  She returned to work in 3.5-4 months    Medications: avoids NSAIDS due to GI upset farzaneh tylenol, has had celebrex in the past but could only tolerate for 2 weeks  But has started taking sucralfate + prilosec since then    Injections: had CSI in R knee pre-op, did not help, has not had in the left    Physical Therapy: PT for R TKA, not for left    Bracing: Yes, sleeve, the patient reported that the sleeve helps with stability but at the end of the day her left knee is swollen and then the sleeve is uncomfortable.     Assistive Devices: None. 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     Walking:   < 2 blocks    Limitations: General walking, difficulty going up/down steps, difficulty getting in/out of the car, difficulty sleeping at night , difficulty rising from sitting , difficulty driving, and difficulty standing for long periods of time          Occupation: The patient works for [employer] in the [employer].     Social support: The patient stated that they live at home with their . The patient stated that their  would be able to help take care of them if they were to have surgery.        ROS:  The updated medical history is in the chart and has been reviewed. A review of systems is updated and there is no reported vision changes,  ear/nose/mouth/throat complaints,  chest pain, shortness of breath, abdominal pain, urological complaints, fevers or chills, psychiatric complaints. Musculoskeletal and neurologcial symptoms are as documented. All other systems are negative.      Objective:   Exam:  There were no vitals filed for this visit.  There is no height or weight on file to calculate BMI.    Physical examination included assessment of the patient's general appearance with particular attention to development, nutrition, body habitus, attention to grooming, and any evidence of distress.  Constitutional: The patient is a well-developed, well-nourished patient in no acute distress.   Cardiovascular: Vascular examination included warmth and capillary refill as well inspection for edema and assessment of pedal pulses. Pulses are palpable and regular.  Musculoskeletal: Gait was assessed as to whether it was steady, non-antalgic, and/or required the use of an assist device. The patient was also asked to walk independently and get onto the examination table.  Skin: The skin was examined for any obvious rashes or lesions in the extremity.  Neurologic: Sensation is intact to light touch in the extremity. The patient has good coordination without hyperreflexia and is alert and oriented to person, place and time and has normal mood and affect.     All of the above were examined and found to be within normal limits except for the following pertinent clinical findings:    Severe limp and antalgic gait favoring the left side.  5-85 degrees supine 90 degree seated range of motion 0° alignment which does not correct.  Tender palpation predominantly medial but also lateral patellofemoral joint lines with mild-to-moderate effusion knee stable anterior-posterior varus and valgus stresses with flexion contracture but no extensor lag.  Right knee 0-105 degree knee range of motion.  Left side no pain with active straight leg raise her hip no pain with  internal-external range of motion of her hip joint      Imaging:    KNEE L ARTHRITIS     Indication:  Left knee pain  Exam Ordered: Radiographs of the left knee include a standing anteroposterior view, a standing posterioanterior view, a lateral view in full flexion, and a sunrise view  Details of Examination: Exam shows evidence of joint space narrowing, osteophyte formation, and subchondral sclerosis, all consistent with degenerative arthritis of the knee.  No other significant findings are noted.  Impression:  Degenerative Arthritis, Left Knee    Klg4 varus bone on bone knee arthritis    R TKA ok      Assessment:     No diagnosis found.     R TKA 2010 RANULFO [facility], RENU post op, previous ?ACL v patellar tendon recon  Does not tolerate NSAIDs, tylenol, or ASA, but now on prilosec and sucralfate  HTN  GERD       Plan:       This patient has significant symptoms in their knee that are affecting their quality of life and daily activities.  They have tried non-operative treatment including analgesics, an exercise program, and activity modification, but the symptoms have persisted. I believe they make a good candidate for knee arthroplasty.     The risks and benefits of knee arthroplasty have been discussed with the patient which include, but are not limited to infections (including severe sequelae), potential component failure, fracture, DVT, pulmonary embolus, nerve palsy, poor range of motion, the possibility of bone grafting, persistent pain, wound healing complications, transfusions, medical complications and death.     We discussed surgical options including implant type and why I believe the implant selected is a good match for the patient's needs. The patient expressed understanding and agrees to proceed with the planned surgery.     Pre-operative planning will include the following:  A pre-surgical evaluation by will be arranged.  Pre-op orders will be placed.  We will make arrangements with the operating room  for proper time and staffing.  We will make Social Service arrangements for the patient.    Implants:   Company: [employer]  System: Attune  primary tray    DVT prophylaxis: ASA 81mg x1 @12hrs post op, Eliquis 2.5mg BID x30 days @24hrs post op    Dispo: outpatient PT    Admission status: Inpatient    Location: [facility]      L knee very stiff pre-op with Hx of RENU after R TKA and doesn't tolerate NSAIDS, discussed increased risk of stiffness and need for RENU with L knee    Cbrex 100mg daily x7 days  Tylenol 650BID  Eliquis instead of ASA  Continue sucralfate + prilosec  Hold HRT 2-4 weeks  OP PT not virtual    +11 DF  4deg  At least 8  Not too tight        No orders of the defined types were placed in this encounter.            Past Medical History:   Diagnosis Date    Arthritis     GERD (gastroesophageal reflux disease)     IBS (irritable bowel syndrome)        Past Surgical History:   Procedure Laterality Date    HAND SURGERY Bilateral     Carpal tunnel surgery - both sides    HYSTERECTOMY      JOINT REPLACEMENT      left shoulder  2016    NOSE SURGERY      x's 2    TOTAL KNEE ARTHROPLASTY Right 2012       Family History   Problem Relation Age of Onset    Osteoarthritis Mother     Osteoarthritis Maternal Grandmother     Diabetes Maternal Grandmother     Heart disease Maternal Grandmother     Diabetes Paternal Grandmother     Heart disease Maternal Uncle        Social History     Socioeconomic History    Marital status:    Tobacco Use    Smoking status: Former     Types: Cigarettes     Quit date: 2022     Years since quittin.5    Smokeless tobacco: Never   Substance and Sexual Activity    Alcohol use: Yes     Comment: once - 2 times a month , 1-2 drinks    Drug use: Never    Sexual activity: Not Currently     Partners: Male     Birth control/protection: See Surgical Hx

## 2023-02-14 NOTE — ASSESSMENT & PLAN NOTE
Does not sound Cardiac   Tips to control reflux discussed      GERD-  I suggest continuation of the Proton pump inhibitor in the perioperative period . I suggest aspiration precautions     She tries to avoid acid take foods and soft drinks     Contributors     Weight   Coffee   Chocolate  Soda  Sleeping on the Stomach  Tobacco use    With increased activity and possibly weight loss after surgery her acid reflux will likely to be better

## 2023-02-14 NOTE — DISCHARGE INSTRUCTIONS
Your surgery has been scheduled for:______2/15/23____________________________________    You should report to:  ____[facility], located on the [place] side of the first floor of the           [facility] ([phone])  __X__[facility], located on the [facility] side of the            Second floor of the [facility] ([phone])  ____3rd Floor [facility] located on the [facility] side of the [facility] [phone]  ____[facility]: located at [address] [facility] [place] [address]. [address]     Please Note   Tell your doctor if you take Aspirin, products containing Aspirin, herbal medications  or blood thinners, such as Coumadin, Ticlid, or Plavix.  (Consult your provider regarding holding or stopping before surgery).  Arrange for someone to drive you home following surgery.  You will not be allowed to leave the surgical facility alone or drive yourself home following sedation and anesthesia.    Before Surgery  Stop taking all herbal medications, vitamins, and supplements 7 days prior to surgery  No Motrin/Advil (Ibuprofen) 7 days before surgery  No Aleve (Naproxen) 7 days before surgery  Stop Taking Asprin, products containing Asprin __7___days before surgery  Stop taking blood thinners___NA____days before surgery  No Goody's/BC  Powder 7 days before surgery  Refrain from drinking alcoholic beverages for 24hours before and after surgery  Stop or limit smoking ____7_____days before surgery  You may take Tylenol for pain    Night before Surgery  Do not eat or drink after midnight  Take a shower or bath (shower is recommended).  Bathe with Hibiclens soap or an antibacterial soap from the neck down.  If not supplied by your surgeon, hibiclens soap will need to be purchased over the counter in pharmacy.  Rinse soap off thoroughly.  Shampoo your hair with your regular shampoo    The Day of  Surgery  Take another bath or shower with hibiclens or any antibacterial soap, to reduce the chance of infection.  Take heart and blood pressure medications with a small sip of water, as advised by the perioperative team.  Do not take fluid pills  You may brush your teeth and rinse your mouth, but do not swall any additional water.   Do not apply perfumes, powder, body lotions or deodorant on the day of surgery.  Nail polish should be removed.  Do not wear makeup or moisturizer  Wear comfortable clothes, such as a button front shirt and loose fitting pants.  Leave all jewelry, including body piercings, and valuables at home.    Bring any devices you will neeed after surgery such as crutches or canes.  If you have sleep apnea, please bring your CPAP machine  In the event that your physical condition changes including the onset of a cold or respiratory illness, or if you have to delay or cancel your surgery, please notify your surgeon.       Anesthesia: General Anesthesia     You are watched continuously during your procedure by your anesthesia provider.     Youre due to have surgery. During surgery, youll be given medicine called anesthesia or anesthetic. This will keep you comfortable and pain-free. Your anesthesia provider will use general anesthesia.  What is general anesthesia?  General anesthesia puts you into a state like deep sleep. It goes into the bloodstream (IV anesthetics), into the lungs (gas anesthetics), or both. You feel nothing during the procedure. You will not remember it. During the procedure, the anesthesia provider monitors you continuously. He or she checks your heart rate and rhythm, blood pressure, breathing, and blood oxygen.  IV anesthetics. IV anesthetics are given through an IV line in your arm. Theyre often given first. This is so you are asleep before a gas anesthetic is started. Some kinds of IV anesthetics relieve pain. Others relax you. Your doctor will decide which kind is best  in your case.  Gas anesthetics. Gas anesthetics are breathed into the lungs. They are often used to keep you asleep. They can be given through a facemask or a tube placed in your larynx or trachea (breathing tube).  If you have a facemask, your anesthesia provider will most likely place it over your nose and mouth while youre still awake. Youll breathe oxygen through the mask as your IV anesthetic is started. Gas anesthetic may be added through the mask.  If you have a tube in the larynx or trachea, it will be inserted into your throat after youre asleep.  Anesthesia tools and medicines  You will likely have:  IV anesthetics. These are put into an IV line into your bloodstream.  Gas anesthetics. You breathe these anesthetics into your lungs, where they pass into your bloodstream.  Pulse oximeter. This is a small clip that is attached to the end of your finger. This measures your blood oxygen level.  Electrocardiography leads (electrodes). These are small sticky pads that are placed on your chest. They record your heart rate and rhythm.  Blood pressure cuff. This reads your blood pressure.  Risks and possible complications  General anesthesia has some risks. These include:  Breathing problems  Nausea and vomiting  Sore throat or hoarseness (usually temporary)  Allergic reaction to the anesthetic  Irregular heartbeat (rare)  Cardiac arrest (rare)   Anesthesia safety  Follow all instructions you are given for how long not to eat or drink before your procedure.  Be sure your doctor knows what medicines and drugs you take. This includes over-the-counter medicines, herbs, supplements, alcohol or other drugs. You will be asked when those were last taken.  Have an adult family member or friend drive you home after the procedure.  For the first 24 hours after your surgery:  Do not drive or use heavy equipment.  Do not make important decisions or sign legal documents. If important decisions or signing legal documents is  necessary during the first 24 hours after surgery, have a trusted family member or spouse act on your behalf.  Avoid alcohol.  Have a responsible adult stay with you. He or she can watch for problems and help keep you safe.  Date Last Reviewed: 12/1/2016 © 2000-2017 [employer]. [address] [place] [address], [place] [address]. All rights reserved. This information is not intended as a substitute for professional medical care. Always follow your healthcare professional's instructions.

## 2023-02-14 NOTE — ANESTHESIA PAT ROS NOTE
02/14/2023  Licha Gonzales is a 57 y.o., female.      Pre-op Assessment          Review of Systems  Anesthesia Hx:  No problems with previous Anesthesia             Denies Family Hx of Anesthesia complications.    Denies Personal Hx of Anesthesia complications.                    Social:  Former Smoker, Social Alcohol Use QUIT 8/15/22      Hematology/Oncology:    Oncology Normal                Hematology Comments: VITAMIN D DEFICIENT                    EENT/Dental:  chronic allergic rhinitis     Nasal Problems:  include Bilateral Nares WIDENED DRAINAGE HOLES      Cardiovascular:      Denies Hypertension.       Denies Angina.     no hyperlipidemia  Denies MURRIETA.    Functional Capacity 4 METS                         Pulmonary:       Denies Shortness of breath.  Denies Recent URI. Sleep Apnea She deferred sleep evaluation until post op               Renal/:  Renal/ Normal                 Hepatic/GI:   Denies PUD.  GERD, well controlled   Denies Hepatitis.    Bowel Conditions:  Irritable Bowel Syndrome     Liver Disease, Fatty Liver        Musculoskeletal:     Joint Disease:  Arthritis, Osteoarthritis LEFT KNEE OSTEOARTHRITIS    HX OF LEFT SHOULDER ARTHROSCOPY    2012 RIGHT TKA      Bone Disorders:     +WAYNE        OB/GYN/PEDS:  HX PCOS, HYSTERECTOMY.    HORMONE REPLACEMENT THERAPY           Neurological:  Denies TIA.  Denies CVA.   Headaches Denies Seizures.    HX OF MIGRAINES    Osteoarthritis                           Endocrine:  Denies Diabetes. Denies Hypothyroidism.  Denies Hyperthyroidism.       Obesity / BMI > 30  Dermatological:  Skin Normal    Psych:  Psychiatric Normal                  Physical Exam  General: Well nourished, Cooperative, Alert and Oriented    Airway:  Mouth Opening: Normal  Tongue: Normal  Neck ROM: Normal ROM    Dental:  Intact    Chest/Lungs:  Clear to auscultation, Normal  Respiratory Rate    Heart:  Rate: Normal  Rhythm: Regular Rhythm  Sounds: Normal      2/14/2023 MEDICAL CLEARANCE  This visit was focused on Preoperative evaluation, Perioperative Medical management, complication reduction plans. I suggest that the patient follows up with primary care or relevant sub specialists for ongoing health care.     Patient is optimized     Patient/ care giver/ Family member was instructed to call and update me about any changes to health,  medication, office visits ,testing out side of the [facility] operative care center , hospitalizations between now and surgery       Shannon Bird MD  Internal Medicine  [facility]   Cell Phone- [phone]     History of COVID -no

## 2023-02-14 NOTE — ASSESSMENT & PLAN NOTE
She has been commenced on hormone replacement therapy estradiol in February of 2022 for heart flashes, night sweats and difficulty sleeping     She had a hysterectomy and left-sided ovary removal -she had this done many years ago for polycystic ovarian syndrome and endometriosis     On hormone replacement therapy   Risk / benefits ofhormone replacement therpay   use in the perioperative period discussed   Risk of thrombosis discussed with hormone replacement therpay  use  Preferable to hold hormone replacement therpay 1-2 weeks pre op until  Mobility returns to base line   She choose to hold it 1.5 week before surgery     I have discussed that she will be receiving pharmacological venous thromboembolism prophylaxis and I suggested to stay off of the hormone replacement therapy until she completes the thromboembolism prophylaxis

## 2023-02-14 NOTE — PROGRESS NOTES
Saw pt with  in clinic. Introduced myself and confirmed 8:20  time for surgery in the a.m. 9:00 arrival, surgery at 11:00 a.m. Understanding verbalized and opportunity given to ask questions.

## 2023-02-14 NOTE — ASSESSMENT & PLAN NOTE
She has diarrhea and constipation alternatively   Since she is been taking the proton pump inhibitor and modified her diet she is doing good in that area  She is up-to-date with colonoscopy  She does have hiatal hernia  No difficulty swallowing

## 2023-02-14 NOTE — HPI
History of present illness- I had the pleasure of meeting this pleasant 57 y.o. lady in the pre op clinic prior to her elective Orthopedic surgery. Mrs Hurt was accompanied by  Mr Mcdonough.    I have obtained the history by speaking to the patient and by reviewing the electronic health records.    Events leading up to surgery / History of presenting illness -    She has been troubled with moderate-severe   Left knee pain for 1 -1.5 years  .   Pain increases with activity and decreases with resting.  She has stiffness of the left knee  She avoids taking Tylenol and anti-inflammatory medicines due to stomach upset, acid reflux  No previous left knee surgeries    Relevant health conditions of significance for the perioperative period/ History of presenting illness -  She has moved to [place] about 4 years ago   She was brought up in [place] and had the opposite knee replacement done about 10 years ago in [place] -events leading up to right-sided knee replacement was that she was on a stepstool and was  trying to reach something when she pop in her right knee   He had a detached ACL and had an ACL repair which did not help her and that led to the knee replacement surgery  No history of trauma     Subjectively describes health as good      Health conditions of significance for the perioperative period        - Allergies  - HRT  - Acid reflux  - Elevated BP  - Obesity  - PCOS  - Hysterectomy      She lives with her  and a dog in a single-level house   Her  is going to help her after the surgery     She is having the surgery under the [facility]   She is a [employer] employee   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

## 2023-02-14 NOTE — PROGRESS NOTES
Licha Gonzales is a 57 y.o. year old here today for preoperative visit in preparation for a Left total knee arthroplasty to be performed by Dr. Teague  on 2/15/2023.  she was last seen and treated in the clinic on 2/14/2023. she will be medically optimized by the pre op center. There has been no significant change in medical status since last visit. No fever, chills, malaise, or unexplained weight change.      Allergies, Medications, past medical and surgical history reviewed.    Focused examination performed.    Patient saw surgeon in clinic today. All questions answered. Patient encouraged to call with questions. Contact information given.     Pre, lai, and post operative procedures and expectations discussed. Goals of successful surgery reviewed and include manageable pain levels, surgical site free of infection, medication management, and ambulation with PT/OT assistance. Healthy weight management discussed with patient and caregiver who were receptive to eduction of healthy diet and activity. No other necessary lifestyle changes identified. Educated patient about signs and symptoms of infection, medication management, anticoagulation therapy, risk of tobacco and alcohol use, and self-care to promote healing. Surgical guide given for future reference. Hibiclens given to patient with instructions. All questions were answered.     Licha Gonzales verbalized an understanding to the education and goals. Patient has displayed readiness to engage in care and is ready to proceed with surgery.  Patient reports her  is able and ready to provide assistance at home after discharge.    Surgical and blood consents signed.    Licha Gonzales will contact us if there are any questions, concerns, or changes in medical status prior to surgery.       Joint class: private    Patient has discussed discharge planning with surgeon. Patient will be discharged to home following surgery.   patient will be scheduled with [facility].      30 minutes of time was spent on patient education, review of records, templating, H&P, , appointment scheduling and optimizing patient for surgery.

## 2023-02-14 NOTE — ASSESSMENT & PLAN NOTE
Her her height is 5 ft 5 in and weight is 215 lb   BMI 35.78     Weight related conditions      Known to have         Elevated BP  Acid reflux   Osteoarthritis     Not troubled with / Not known to have         Type 2 Diabetes \  Hyperlipidemia   Sleep apnea   Fatty liver         Encouraged weight loss    With increased mobility after joint replacement surgery she will likely lose weight

## 2023-02-14 NOTE — PROGRESS NOTES
[facility]  Progress Note    Patient Name: Licha Gonzales  MRN: [id number]  Date of Evaluation- 02/14/2023  PCP- Clint Lindsey MD    Future cases for Licha Gonzales [[id number]]       Case ID Status Date Time Reynaldo Procedure Provider Location    [id number] [facility] 2/15/2023 11:00  ARTHROPLASTY, left KNEE, TOTAL Depuy-Attune NI Dr. Ho Teague III, MD [[id number]] [facility] OR            HPI:  History of present illness- I had the pleasure of meeting this pleasant 57 y.o. lady in the pre op clinic prior to her elective Orthopedic surgery. Mrs Hurt was accompanied by  Mr Mcdonough.    I have obtained the history by speaking to the patient and by reviewing the electronic health records.    Events leading up to surgery / History of presenting illness -    She has been troubled with moderate-severe   Left knee pain for 1 -1.5 years  .   Pain increases with activity and decreases with resting.  She has stiffness of the left knee  She avoids taking Tylenol and anti-inflammatory medicines due to stomach upset, acid reflux  No previous left knee surgeries    Relevant health conditions of significance for the perioperative period/ History of presenting illness -  She has moved to [place] about 4 years ago   She was brought up in [place] and had the opposite knee replacement done about 10 years ago in [place] -events leading up to right-sided knee replacement was that she was on a stepstool and was  trying to reach something when she pop in her right knee   He had a detached ACL and had an ACL repair which did not help her and that led to the knee replacement surgery  No history of trauma     Subjectively describes health as good      Health conditions of significance for the perioperative period        - Allergies  - HRT  - Acid reflux  - Elevated BP  - Obesity  - PCOS  - Hysterectomy      She lives with her  and a dog in a single-level house   Her  is going to help her after  the surgery     She is having the surgery under the [facility]   She is a [employer] employee   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 [employer]  Her job involves physical activity    She does not have children and had tough time conceiving due to PCOS               Subjective/ Objective:     Chief complaint-Preoperative evaluation, Perioperative Medical management, complication reduction plan     Active cardiac conditions- none    Revised cardiac risk index predictors- none    Functional capacity -Examples of physical activity , active person, she works 40 hours a week of a physical job, can take 1 flight of stairs----- She can undertake all the above activities without  chest pain,chest tightness, Shortness of breath ,dizziness,lightheadedness making her exercise tolerance more,   than 4 Mets.       Review of Systems   Constitutional:  Negative for chills and fever.          Weight gain from reduced activity   HENT:          STOPBANG score  / 8    Loud Snoring   BMI> 35   Age over 50        Eyes:         No new visual changes   Respiratory:          No cough , phlegm    No Hemoptysis   Cardiovascular:         As noted   Gastrointestinal:         No overt GI/ blood losses  Bowel movements- Regular    Endocrine:        Prednisone use > 20 mg daily for 3 weeks-none   Genitourinary:  Negative for dysuria.        No urinary hesitancy    Musculoskeletal:         As above      Skin:  Negative for rash.   Neurological:  Negative for syncope.        No unilateral weakness   Hematological:         Current use of Anticoagulants  None   Psychiatric/Behavioral:          No Depression,Anxiety     No vascular stenting history          No anesthesia, bleeding, cardiac problems, PONV with previous surgeries/procedures.  Medications and Allergies reviewed in epic.     FH- No bleeding / venous thrombosis ,problems in family      Physical Exam      Physical Exam  Constitutional-  General  appearance-Conscious,Coherent  Eyes- No conjunctival icterus,pupils  round  and reactive to light   ENT-Oral cavity- moist    , Hearing grossly normal   Neck- No thyromegaly ,Trachea -central, No jugular venous distension,   No Carotid Bruit   Cardiovascular -Heart Sounds- Normal  and  no murmur   , No gallop rhythm   Respiratory - Normal Respiratory Effort, Normal breath sounds,  no wheeze , and  no forced expiratory wheeze    Peripheral pitting pedal edema-- none , no calf pain   Gastrointestinal -Soft abdomen, No palpable masses, Non Tender,Liver,Spleen not palpable. No-- free fluid and shifting dullness  Musculoskeletal- No finger Clubbing. Strength grossly normal   Lymphatic-No Palpable cervical, axillary,Inguinal lymphadenopathy   Psychiatric - normal effect,Orientation  Rt Dorsalis pedis pulses-palpable    Lt Dorsalis pedis pulses- palpable   Rt Posterior tibial pulses -palpable   Left posterior tibial pulses -palpable   Miscellaneous -  Surgical scarRt knee  and  no renal bruit     Investigations  Lab and Imaging have been reviewed in [facility].    Review of Medicine tests    EKG- I had independently reviewed the EKG from--2/14/2023  no acute changes    Review of clinical lab tests:  Lab Results   Component Value Date    HGB 13.9 02/14/2023     02/14/2023         Review of old records- Was done and information gathered regards to events leading to surgery and health conditions of significance in the perioperative period             Preoperative cardiac risk assessment-  The patient does not have any active cardiac conditions . Revised cardiac risk index predictors- 0---.Functional capacity is more than 4 Mets. She will be undergoing a Orthopedic procedure that carries a Moderate Risk risk     Risk of a major Cardiac event ( Defined as death, myocardial infarction, or cardiac arrest at 30 days after noncardiac surgery), based on RCRI score     -3.9%         No further cardiac work up is indicated prior to  proceeding with the surgery          American Society of Anesthesiologists Physical status classification ( ASA ) class: 2     Postoperative pulmonary complication risk assessment:         Cesario Respiratory failure index- percentage risk of respiratory failure: 0.5 %    Assessment/Plan:     Seasonal allergies  Doing good   She has allergies, sinus problem at that sometimes can lead to bronchitis   She recently seems to be doing fairly well with the allergies    She has been only taking Singulair for the past 1 week and she wants to take that tonight    He has not had any wheezing problem lately   She has not been using albuterol lately    Elevated BP without diagnosis of hypertension  It was felt that her elevated blood pressure could be from the left knee pain     Home BP- 120-130/75-86  Watches salt intake    With increased mobility after joint replacement surgery, her blood pressure will likely improve    WAYNE positive  She is not known to have rheumatoid arthritis  She does not have any problems with dropping things or problems with small objects like keys coins or buttons  No history of recurrent miscarriages   No history of pregnancy   Not known to have thyroid problems, vitiligo, pernicious anemia, type 1 diabetes  She is not known to have any autoimmune condition     Had Work up through PCP  Rheumatoid factor within the reference range     Hormone replacement therapy (HRT)  She has been commenced on hormone replacement therapy estradiol in February of 2022 for heart flashes, night sweats and difficulty sleeping     She had a hysterectomy and left-sided ovary removal -she had this done many years ago for polycystic ovarian syndrome and endometriosis     On hormone replacement therapy   Risk / benefits ofhormone replacement therpay   use in the perioperative period discussed   Risk of thrombosis discussed with hormone replacement therpay  use  Preferable to hold hormone replacement therpay 1-2 weeks pre op  until  Mobility returns to base line   She choose to hold it 1.5 week before surgery     I have discussed that she will be receiving pharmacological venous thromboembolism prophylaxis and I suggested to stay off of the hormone replacement therapy until she completes the thromboembolism prophylaxis    Obesity  Her her height is 5 ft 5 in and weight is 215 lb   BMI 35.78     Weight related conditions      Known to have         Elevated BP  Acid reflux   Osteoarthritis     Not troubled with / Not known to have         Type 2 Diabetes \  Hyperlipidemia   Sleep apnea   Fatty liver         Encouraged weight loss    With increased mobility after joint replacement surgery she will likely lose weight    Prediabetes      Gastroesophageal reflux disease with esophagitis without hemorrhage  Does not sound Cardiac   Tips to control reflux discussed      GERD-  I suggest continuation of the Proton pump inhibitor in the perioperative period . I suggest aspiration precautions     She tries to avoid acid take foods and soft drinks     Contributors     Weight   Coffee   Chocolate  Soda  Sleeping on the Stomach  Tobacco use    With increased activity and possibly weight loss after surgery her acid reflux will likely to be better    Former smoker  Quit Aug 2022  Smoked on and off       I suggested to consider stopping  smoking tobacco for its benefits in the lai operative period and in the long term     I  Informed about risk of wound healing problem ,infection,lung complications,thrombosis with tobacco use      Suggested quitting tobacco       Not known to have COPD,  Emphysema,  , chronic phlegm   No oxygen use      Breathing fine    Since he quit tobacco she has gained some weight  Reduced mobility might also contributed to the weight    Laboratory test                 PLT-N     EKG personally reviewed by me showed no acute appearing changes    Snoring  Possible sleep apnea- I suggest a sleep study and suggest caution with usage of  medication that can cause respiratory suppression in the perioperative period  potential ramifications of untreated sleep apnea, which could include daytime sleepiness, hypertension, heart disease and stroke were discussed     Avoidance of  supine sleep, weight gain , alcoholic beverages , care with , sedative , CNS depressant use indicated  since all of these can worsen MANUEL      She deferred sleep evaluation until post op    With increased mobility, weight loss her snoring might be better after joint replacement surgery    IBS (irritable bowel syndrome)  She has diarrhea and constipation alternatively   Since she is been taking the proton pump inhibitor and modified her diet she is doing good in that area  She is up-to-date with colonoscopy  She does have hiatal hernia  No difficulty swallowing        Preventive perioperative care    Thromboembolic prophylaxis:  Her risk factors for thrombosis include surgical procedure and age.I suggest  thromboembolic prophylaxis ( mechanical/pharmacological, weighing the risk benefits of pharmacological agent use considering lai procedural bleeding )  during the perioperative period.I suggested being active in the post operative period.   The patient is a candidate for extended DVT prophylaxis      Postoperative pulmonary complication prophylaxis-- I suggest early ambulation and end tidal carbon dioxide monitoring  , oral care , head end of bed elevation      Renal complication prophylaxis- . I suggest keeping her well hydrated  in the perioperative period.       Surgical site Infection Prophylaxis-I  suggest appropriate antibiotic for Prophylaxis against Surgical site infections  No reported Staph infection  Skin antibacterial discussed          In view of regional anesthesia the patient  is at risk of postoperative urinary retention.  I suggest avoidance / minimizing the of  Benzodiazepines,Anticholinergic medication,antihistamines ( Benadryl) , if possible in the  perioperative period. I suggest using the minimum possible use of opioids for the minimum period of time in the perioperative period. Benadryl avoidance suggested      This visit was focused on Preoperative evaluation, Perioperative Medical management, complication reduction plans. I suggest that the patient follows up with primary care or relevant sub specialists for ongoing health care.    I appreciate the opportunity to be involved in this patients care. Please feel free to contact me if there were any questions about this consultation.    Patient is optimized    Patient/ care giver/ Family member was instructed to call and update me about any changes to health,  medication, office visits ,testing out side of the lai operative care center , hospitalizations between now and surgery      Shannon Bird MD  Internal Medicine  [facility]   Cell Phone- [phone]    History of COVID -no   COVID vaccination status -2    COVID screening     No fever   No cough   No SOB  No sore throat   No loss of taste or smell   No muscle aches   No nausea, vomiting , diarrhea   Check for over-the-counter medication  --  2/14/2023-16 58  EKG report from today showed  Normal sinus rhythm   Normal ECG   No previous ECGs available    Called to follow up , spoke to to address any concerns with the up coming surgery or any questions on Medication instructions -  Doing well ,No changes to Medication, Health -    Urinalysis is currently pending she does not have any suggestions of urinary tract infection

## 2023-02-14 NOTE — OUTPATIENT SUBJECTIVE & OBJECTIVE
Outpatient Subjective & Objective     Chief complaint-Preoperative evaluation, Perioperative Medical management, complication reduction plan     Active cardiac conditions- none    Revised cardiac risk index predictors- none    Functional capacity -Examples of physical activity , active person, she works 40 hours a week of a physical job, can take 1 flight of stairs----- She can undertake all the above activities without  chest pain,chest tightness, Shortness of breath ,dizziness,lightheadedness making her exercise tolerance more,   than 4 Mets.       Review of Systems   Constitutional:  Negative for chills and fever.          Weight gain from reduced activity   HENT:          STOPBANG score  / 8    Loud Snoring   BMI> 35   Age over 50        Eyes:         No new visual changes   Respiratory:          No cough , phlegm    No Hemoptysis   Cardiovascular:         As noted   Gastrointestinal:         No overt GI/ blood losses  Bowel movements- Regular    Endocrine:        Prednisone use > 20 mg daily for 3 weeks-none   Genitourinary:  Negative for dysuria.        No urinary hesitancy    Musculoskeletal:         As above      Skin:  Negative for rash.   Neurological:  Negative for syncope.        No unilateral weakness   Hematological:         Current use of Anticoagulants  None   Psychiatric/Behavioral:          No Depression,Anxiety     No vascular stenting history          No anesthesia, bleeding, cardiac problems, PONV with previous surgeries/procedures.  Medications and Allergies reviewed in epic.     FH- No bleeding / venous thrombosis ,problems in family      Physical Exam      Physical Exam  Constitutional-  General appearance-Conscious,Coherent  Eyes- No conjunctival icterus,pupils  round  and reactive to light   ENT-Oral cavity- moist    , Hearing grossly normal   Neck- No thyromegaly ,Trachea -central, No jugular venous distension,   No Carotid Bruit   Cardiovascular -Heart Sounds- Normal  and  no murmur   ,  No gallop rhythm   Respiratory - Normal Respiratory Effort, Normal breath sounds,  no wheeze , and  no forced expiratory wheeze    Peripheral pitting pedal edema-- none , no calf pain   Gastrointestinal -Soft abdomen, No palpable masses, Non Tender,Liver,Spleen not palpable. No-- free fluid and shifting dullness  Musculoskeletal- No finger Clubbing. Strength grossly normal   Lymphatic-No Palpable cervical, axillary,Inguinal lymphadenopathy   Psychiatric - normal effect,Orientation  Rt Dorsalis pedis pulses-palpable    Lt Dorsalis pedis pulses- palpable   Rt Posterior tibial pulses -palpable   Left posterior tibial pulses -palpable   Miscellaneous -  Surgical scarRt knee  and  no renal bruit     Investigations  Lab and Imaging have been reviewed in epic.    Review of Medicine tests    EKG- I had independently reviewed the EKG from--2/14/2023  no acute changes    Review of clinical lab tests:  Lab Results   Component Value Date    HGB 13.9 02/14/2023     02/14/2023         Review of old records- Was done and information gathered regards to events leading to surgery and health conditions of significance in the perioperative period         Outpatient Subjective & Objective

## 2023-02-14 NOTE — ASSESSMENT & PLAN NOTE
Doing good   She has allergies, sinus problem at that sometimes can lead to bronchitis   She recently seems to be doing fairly well with the allergies    She has been only taking Singulair for the past 1 week and she wants to take that tonight    He has not had any wheezing problem lately   She has not been using albuterol lately

## 2023-02-15 ENCOUNTER — ANESTHESIA (OUTPATIENT)
Dept: SURGERY | Facility: HOSPITAL | Age: 58
DRG: 470 | End: 2023-02-15
Payer: COMMERCIAL

## 2023-02-15 ENCOUNTER — ANESTHESIA EVENT (OUTPATIENT)
Dept: SURGERY | Facility: HOSPITAL | Age: 58
DRG: 470 | End: 2023-02-15
Payer: COMMERCIAL

## 2023-02-15 ENCOUNTER — HOSPITAL ENCOUNTER (INPATIENT)
Facility: HOSPITAL | Age: 58
LOS: 1 days | Discharge: HOME | DRG: 470 | End: 2023-02-16
Attending: ORTHOPAEDIC SURGERY | Admitting: ORTHOPAEDIC SURGERY
Payer: COMMERCIAL

## 2023-02-15 DIAGNOSIS — M17.12 PRIMARY OSTEOARTHRITIS OF LEFT KNEE: ICD-10-CM

## 2023-02-15 DIAGNOSIS — Z96.652 STATUS POST LEFT KNEE REPLACEMENT: Primary | ICD-10-CM

## 2023-02-15 DIAGNOSIS — Z96.652 STATUS POST LEFT KNEE REPLACEMENT: ICD-10-CM

## 2023-02-15 PROCEDURE — 25000003 PHARM REV CODE 250: Performed by: NURSE PRACTITIONER

## 2023-02-15 PROCEDURE — D9220A PRA ANESTHESIA: Mod: CRNA,COE,, | Performed by: NURSE ANESTHETIST, CERTIFIED REGISTERED

## 2023-02-15 PROCEDURE — 25000003 PHARM REV CODE 250: Performed by: STUDENT IN AN ORGANIZED HEALTH CARE EDUCATION/TRAINING PROGRAM

## 2023-02-15 PROCEDURE — 97165 OT EVAL LOW COMPLEX 30 MIN: CPT

## 2023-02-15 PROCEDURE — 25000003 PHARM REV CODE 250: Performed by: NURSE ANESTHETIST, CERTIFIED REGISTERED

## 2023-02-15 PROCEDURE — 63600175 PHARM REV CODE 636 W HCPCS: Performed by: NURSE PRACTITIONER

## 2023-02-15 PROCEDURE — 97535 SELF CARE MNGMENT TRAINING: CPT

## 2023-02-15 PROCEDURE — 97530 THERAPEUTIC ACTIVITIES: CPT

## 2023-02-15 PROCEDURE — C1776 JOINT DEVICE (IMPLANTABLE): HCPCS | Performed by: ORTHOPAEDIC SURGERY

## 2023-02-15 PROCEDURE — 63600175 PHARM REV CODE 636 W HCPCS: Performed by: ANESTHESIOLOGY

## 2023-02-15 PROCEDURE — 71000033 HC RECOVERY, INTIAL HOUR: Performed by: ORTHOPAEDIC SURGERY

## 2023-02-15 PROCEDURE — D9220A PRA ANESTHESIA: ICD-10-PCS | Mod: ANES,COE,, | Performed by: ANESTHESIOLOGY

## 2023-02-15 PROCEDURE — 27201423 OPTIME MED/SURG SUP & DEVICES STERILE SUPPLY: Performed by: ORTHOPAEDIC SURGERY

## 2023-02-15 PROCEDURE — 25000003 PHARM REV CODE 250: Performed by: ORTHOPAEDIC SURGERY

## 2023-02-15 PROCEDURE — 99900035 HC TECH TIME PER 15 MIN (STAT)

## 2023-02-15 PROCEDURE — 36000710: Performed by: ORTHOPAEDIC SURGERY

## 2023-02-15 PROCEDURE — 37000008 HC ANESTHESIA 1ST 15 MINUTES: Performed by: ORTHOPAEDIC SURGERY

## 2023-02-15 PROCEDURE — 11000001 HC ACUTE MED/SURG PRIVATE ROOM

## 2023-02-15 PROCEDURE — D9220A PRA ANESTHESIA: Mod: ANES,COE,, | Performed by: ANESTHESIOLOGY

## 2023-02-15 PROCEDURE — 71000039 HC RECOVERY, EACH ADD'L HOUR: Performed by: ORTHOPAEDIC SURGERY

## 2023-02-15 PROCEDURE — 27447 PR TOTAL KNEE ARTHROPLASTY: ICD-10-PCS | Mod: LT,COE,, | Performed by: ORTHOPAEDIC SURGERY

## 2023-02-15 PROCEDURE — D9220A PRA ANESTHESIA: ICD-10-PCS | Mod: CRNA,COE,, | Performed by: NURSE ANESTHETIST, CERTIFIED REGISTERED

## 2023-02-15 PROCEDURE — 36000711: Performed by: ORTHOPAEDIC SURGERY

## 2023-02-15 PROCEDURE — C1713 ANCHOR/SCREW BN/BN,TIS/BN: HCPCS | Performed by: ORTHOPAEDIC SURGERY

## 2023-02-15 PROCEDURE — 94761 N-INVAS EAR/PLS OXIMETRY MLT: CPT

## 2023-02-15 PROCEDURE — 37000009 HC ANESTHESIA EA ADD 15 MINS: Performed by: ORTHOPAEDIC SURGERY

## 2023-02-15 PROCEDURE — 27447 TOTAL KNEE ARTHROPLASTY: CPT | Mod: LT,COE,, | Performed by: ORTHOPAEDIC SURGERY

## 2023-02-15 PROCEDURE — 63600175 PHARM REV CODE 636 W HCPCS: Performed by: NURSE ANESTHETIST, CERTIFIED REGISTERED

## 2023-02-15 DEVICE — BASEPLATE ATTUNE TIB FIX CEM 4: Type: IMPLANTABLE DEVICE | Site: KNEE | Status: FUNCTIONAL

## 2023-02-15 DEVICE — PATELLA ATTUNE MEDLZD DOME 35: Type: IMPLANTABLE DEVICE | Site: KNEE | Status: FUNCTIONAL

## 2023-02-15 DEVICE — CEMENT BONE SURG SMPLX P RADPQ: Type: IMPLANTABLE DEVICE | Site: KNEE | Status: FUNCTIONAL

## 2023-02-15 RX ORDER — MUPIROCIN 20 MG/G
1 OINTMENT TOPICAL 2 TIMES DAILY
Status: DISCONTINUED | OUTPATIENT
Start: 2023-02-15 | End: 2023-02-16 | Stop reason: HOSPADM

## 2023-02-15 RX ORDER — PREGABALIN 75 MG/1
75 CAPSULE ORAL 2 TIMES DAILY
Status: DISCONTINUED | OUTPATIENT
Start: 2023-02-15 | End: 2023-02-16 | Stop reason: HOSPADM

## 2023-02-15 RX ORDER — MIDAZOLAM HYDROCHLORIDE 1 MG/ML
INJECTION, SOLUTION INTRAMUSCULAR; INTRAVENOUS
Status: DISCONTINUED | OUTPATIENT
Start: 2023-02-15 | End: 2023-02-15

## 2023-02-15 RX ORDER — OXYCODONE HYDROCHLORIDE 5 MG/1
5 TABLET ORAL
Status: DISCONTINUED | OUTPATIENT
Start: 2023-02-15 | End: 2023-02-16 | Stop reason: HOSPADM

## 2023-02-15 RX ORDER — FENTANYL CITRATE 50 UG/ML
25 INJECTION, SOLUTION INTRAMUSCULAR; INTRAVENOUS EVERY 5 MIN PRN
Status: COMPLETED | OUTPATIENT
Start: 2023-02-15 | End: 2023-02-15

## 2023-02-15 RX ORDER — ACETAMINOPHEN 325 MG/1
650 TABLET ORAL 2 TIMES DAILY
Status: DISCONTINUED | OUTPATIENT
Start: 2023-02-15 | End: 2023-02-16 | Stop reason: HOSPADM

## 2023-02-15 RX ORDER — SODIUM CHLORIDE 9 MG/ML
INJECTION, SOLUTION INTRAVENOUS
Status: COMPLETED | OUTPATIENT
Start: 2023-02-15 | End: 2023-02-15

## 2023-02-15 RX ORDER — OXYCODONE HYDROCHLORIDE 10 MG/1
10 TABLET ORAL
Status: DISCONTINUED | OUTPATIENT
Start: 2023-02-15 | End: 2023-02-16 | Stop reason: HOSPADM

## 2023-02-15 RX ORDER — LIDOCAINE HYDROCHLORIDE 20 MG/ML
INJECTION INTRAVENOUS
Status: DISCONTINUED | OUTPATIENT
Start: 2023-02-15 | End: 2023-02-15

## 2023-02-15 RX ORDER — ACETAMINOPHEN 500 MG
1000 TABLET ORAL EVERY 6 HOURS
Status: DISCONTINUED | OUTPATIENT
Start: 2023-02-15 | End: 2023-02-15

## 2023-02-15 RX ORDER — KETAMINE HYDROCHLORIDE 100 MG/ML
INJECTION, SOLUTION INTRAMUSCULAR; INTRAVENOUS
Status: DISCONTINUED | OUTPATIENT
Start: 2023-02-15 | End: 2023-02-15

## 2023-02-15 RX ORDER — NAPROXEN SODIUM 220 MG/1
81 TABLET, FILM COATED ORAL ONCE
Status: COMPLETED | OUTPATIENT
Start: 2023-02-15 | End: 2023-02-15

## 2023-02-15 RX ORDER — METHOCARBAMOL 500 MG/1
1000 TABLET, FILM COATED ORAL ONCE AS NEEDED
Status: DISCONTINUED | OUTPATIENT
Start: 2023-02-15 | End: 2023-02-15 | Stop reason: HOSPADM

## 2023-02-15 RX ORDER — TRANEXAMIC ACID 100 MG/ML
INJECTION, SOLUTION INTRAVENOUS
Status: DISCONTINUED | OUTPATIENT
Start: 2023-02-15 | End: 2023-02-15 | Stop reason: HOSPADM

## 2023-02-15 RX ORDER — ONDANSETRON 2 MG/ML
INJECTION INTRAMUSCULAR; INTRAVENOUS
Status: DISCONTINUED | OUTPATIENT
Start: 2023-02-15 | End: 2023-02-15

## 2023-02-15 RX ORDER — PREGABALIN 75 MG/1
75 CAPSULE ORAL NIGHTLY
Status: DISCONTINUED | OUTPATIENT
Start: 2023-02-15 | End: 2023-02-15

## 2023-02-15 RX ORDER — FAMOTIDINE 10 MG/ML
INJECTION INTRAVENOUS
Status: DISCONTINUED | OUTPATIENT
Start: 2023-02-15 | End: 2023-02-15

## 2023-02-15 RX ORDER — METHOCARBAMOL 750 MG/1
750 TABLET, FILM COATED ORAL 3 TIMES DAILY
Status: DISCONTINUED | OUTPATIENT
Start: 2023-02-15 | End: 2023-02-16 | Stop reason: HOSPADM

## 2023-02-15 RX ORDER — HYDROMORPHONE HYDROCHLORIDE 1 MG/ML
0.2 INJECTION, SOLUTION INTRAMUSCULAR; INTRAVENOUS; SUBCUTANEOUS EVERY 5 MIN PRN
Status: COMPLETED | OUTPATIENT
Start: 2023-02-15 | End: 2023-02-15

## 2023-02-15 RX ORDER — MORPHINE SULFATE 2 MG/ML
2 INJECTION, SOLUTION INTRAMUSCULAR; INTRAVENOUS
Status: DISCONTINUED | OUTPATIENT
Start: 2023-02-15 | End: 2023-02-16 | Stop reason: HOSPADM

## 2023-02-15 RX ORDER — CELECOXIB 200 MG/1
400 CAPSULE ORAL ONCE
Status: COMPLETED | OUTPATIENT
Start: 2023-02-15 | End: 2023-02-15

## 2023-02-15 RX ORDER — HALOPERIDOL 5 MG/ML
0.5 INJECTION INTRAMUSCULAR EVERY 10 MIN PRN
Status: DISCONTINUED | OUTPATIENT
Start: 2023-02-15 | End: 2023-02-15 | Stop reason: HOSPADM

## 2023-02-15 RX ORDER — ONDANSETRON 2 MG/ML
4 INJECTION INTRAMUSCULAR; INTRAVENOUS EVERY 8 HOURS PRN
Status: DISCONTINUED | OUTPATIENT
Start: 2023-02-15 | End: 2023-02-16 | Stop reason: HOSPADM

## 2023-02-15 RX ORDER — PREGABALIN 75 MG/1
75 CAPSULE ORAL 2 TIMES DAILY
Qty: 60 CAPSULE | Refills: 0 | Status: SHIPPED | OUTPATIENT
Start: 2023-02-15 | End: 2023-08-16

## 2023-02-15 RX ORDER — LABETALOL HYDROCHLORIDE 5 MG/ML
INJECTION, SOLUTION INTRAVENOUS
Status: DISCONTINUED | OUTPATIENT
Start: 2023-02-15 | End: 2023-02-15

## 2023-02-15 RX ORDER — PREGABALIN 75 MG/1
75 CAPSULE ORAL
Status: COMPLETED | OUTPATIENT
Start: 2023-02-15 | End: 2023-02-15

## 2023-02-15 RX ORDER — ACETAMINOPHEN 500 MG
1000 TABLET ORAL
Status: COMPLETED | OUTPATIENT
Start: 2023-02-15 | End: 2023-02-15

## 2023-02-15 RX ORDER — SODIUM CHLORIDE 9 MG/ML
INJECTION, SOLUTION INTRAVENOUS CONTINUOUS
Status: DISCONTINUED | OUTPATIENT
Start: 2023-02-15 | End: 2023-02-16 | Stop reason: HOSPADM

## 2023-02-15 RX ORDER — SODIUM CHLORIDE 0.9 % (FLUSH) 0.9 %
10 SYRINGE (ML) INJECTION
Status: DISCONTINUED | OUTPATIENT
Start: 2023-02-15 | End: 2023-02-15 | Stop reason: HOSPADM

## 2023-02-15 RX ORDER — PROPOFOL 10 MG/ML
VIAL (ML) INTRAVENOUS CONTINUOUS PRN
Status: DISCONTINUED | OUTPATIENT
Start: 2023-02-15 | End: 2023-02-15

## 2023-02-15 RX ORDER — ROPIVACAINE/EPI/CLONIDINE/KET 2.46-0.005
SYRINGE (ML) INJECTION
Status: DISCONTINUED | OUTPATIENT
Start: 2023-02-15 | End: 2023-02-15 | Stop reason: HOSPADM

## 2023-02-15 RX ORDER — BISACODYL 10 MG
10 SUPPOSITORY, RECTAL RECTAL EVERY 12 HOURS PRN
Status: DISCONTINUED | OUTPATIENT
Start: 2023-02-15 | End: 2023-02-16 | Stop reason: HOSPADM

## 2023-02-15 RX ORDER — NICARDIPINE HYDROCHLORIDE 2.5 MG/ML
INJECTION INTRAVENOUS
Status: DISCONTINUED | OUTPATIENT
Start: 2023-02-15 | End: 2023-02-15

## 2023-02-15 RX ORDER — AMOXICILLIN 250 MG
1 CAPSULE ORAL 2 TIMES DAILY
Status: DISCONTINUED | OUTPATIENT
Start: 2023-02-15 | End: 2023-02-16 | Stop reason: HOSPADM

## 2023-02-15 RX ORDER — NALOXONE HCL 0.4 MG/ML
0.02 VIAL (ML) INJECTION
Status: DISCONTINUED | OUTPATIENT
Start: 2023-02-15 | End: 2023-02-16 | Stop reason: HOSPADM

## 2023-02-15 RX ORDER — FAMOTIDINE 20 MG/1
20 TABLET, FILM COATED ORAL 2 TIMES DAILY
Status: DISCONTINUED | OUTPATIENT
Start: 2023-02-15 | End: 2023-02-16 | Stop reason: HOSPADM

## 2023-02-15 RX ORDER — PROCHLORPERAZINE EDISYLATE 5 MG/ML
5 INJECTION INTRAMUSCULAR; INTRAVENOUS EVERY 6 HOURS PRN
Status: DISCONTINUED | OUTPATIENT
Start: 2023-02-15 | End: 2023-02-16 | Stop reason: HOSPADM

## 2023-02-15 RX ORDER — CIPROFLOXACIN HYDROCHLORIDE 3 MG/ML
1 SOLUTION/ DROPS OPHTHALMIC 4 TIMES DAILY
Status: DISCONTINUED | OUTPATIENT
Start: 2023-02-15 | End: 2023-02-15

## 2023-02-15 RX ORDER — OXYCODONE HYDROCHLORIDE 5 MG/1
5 TABLET ORAL
Status: DISCONTINUED | OUTPATIENT
Start: 2023-02-15 | End: 2023-02-15 | Stop reason: HOSPADM

## 2023-02-15 RX ORDER — FENTANYL CITRATE 50 UG/ML
25 INJECTION, SOLUTION INTRAMUSCULAR; INTRAVENOUS EVERY 5 MIN PRN
Status: DISCONTINUED | OUTPATIENT
Start: 2023-02-15 | End: 2023-02-15 | Stop reason: HOSPADM

## 2023-02-15 RX ORDER — DEXAMETHASONE SODIUM PHOSPHATE 4 MG/ML
INJECTION, SOLUTION INTRA-ARTICULAR; INTRALESIONAL; INTRAMUSCULAR; INTRAVENOUS; SOFT TISSUE
Status: DISCONTINUED | OUTPATIENT
Start: 2023-02-15 | End: 2023-02-15

## 2023-02-15 RX ORDER — ONDANSETRON 2 MG/ML
4 INJECTION INTRAMUSCULAR; INTRAVENOUS DAILY PRN
Status: DISCONTINUED | OUTPATIENT
Start: 2023-02-15 | End: 2023-02-15 | Stop reason: HOSPADM

## 2023-02-15 RX ORDER — MUPIROCIN 20 MG/G
1 OINTMENT TOPICAL
Status: COMPLETED | OUTPATIENT
Start: 2023-02-15 | End: 2023-02-15

## 2023-02-15 RX ORDER — FENTANYL CITRATE 50 UG/ML
INJECTION, SOLUTION INTRAMUSCULAR; INTRAVENOUS
Status: DISCONTINUED | OUTPATIENT
Start: 2023-02-15 | End: 2023-02-15

## 2023-02-15 RX ORDER — TALC
6 POWDER (GRAM) TOPICAL NIGHTLY PRN
Status: DISCONTINUED | OUTPATIENT
Start: 2023-02-15 | End: 2023-02-15 | Stop reason: HOSPADM

## 2023-02-15 RX ORDER — FENTANYL CITRATE 50 UG/ML
100 INJECTION, SOLUTION INTRAMUSCULAR; INTRAVENOUS
Status: DISCONTINUED | OUTPATIENT
Start: 2023-02-15 | End: 2023-02-15 | Stop reason: HOSPADM

## 2023-02-15 RX ORDER — LIDOCAINE HYDROCHLORIDE 10 MG/ML
1 INJECTION, SOLUTION EPIDURAL; INFILTRATION; INTRACAUDAL; PERINEURAL
Status: DISCONTINUED | OUTPATIENT
Start: 2023-02-15 | End: 2023-02-15 | Stop reason: HOSPADM

## 2023-02-15 RX ORDER — NAPROXEN SODIUM 220 MG/1
81 TABLET, FILM COATED ORAL DAILY
Status: DISCONTINUED | OUTPATIENT
Start: 2023-02-16 | End: 2023-02-15

## 2023-02-15 RX ORDER — CELECOXIB 100 MG/1
100 CAPSULE ORAL DAILY
Qty: 7 CAPSULE | Refills: 0 | Status: SHIPPED | OUTPATIENT
Start: 2023-02-15

## 2023-02-15 RX ORDER — DEXTROMETHORPHAN HYDROBROMIDE, GUAIFENESIN 5; 100 MG/5ML; MG/5ML
650 LIQUID ORAL EVERY 12 HOURS
Qty: 90 TABLET | Refills: 0 | Status: SHIPPED | OUTPATIENT
Start: 2023-02-15

## 2023-02-15 RX ORDER — NAPROXEN SODIUM 220 MG/1
81 TABLET, FILM COATED ORAL ONCE
Status: DISCONTINUED | OUTPATIENT
Start: 2023-02-15 | End: 2023-02-16 | Stop reason: HOSPADM

## 2023-02-15 RX ORDER — MIDAZOLAM HYDROCHLORIDE 1 MG/ML
4 INJECTION INTRAMUSCULAR; INTRAVENOUS
Status: DISCONTINUED | OUTPATIENT
Start: 2023-02-15 | End: 2023-02-15 | Stop reason: HOSPADM

## 2023-02-15 RX ORDER — POLYETHYLENE GLYCOL 3350 17 G/17G
17 POWDER, FOR SOLUTION ORAL DAILY
Status: DISCONTINUED | OUTPATIENT
Start: 2023-02-15 | End: 2023-02-16 | Stop reason: HOSPADM

## 2023-02-15 RX ORDER — PROPOFOL 10 MG/ML
VIAL (ML) INTRAVENOUS
Status: DISCONTINUED | OUTPATIENT
Start: 2023-02-15 | End: 2023-02-15

## 2023-02-15 RX ADMIN — OXYCODONE HYDROCHLORIDE 10 MG: 10 TABLET ORAL at 02:02

## 2023-02-15 RX ADMIN — NICARDIPINE HYDROCHLORIDE 0.8 MCG: 25 INJECTION INTRAVENOUS at 12:02

## 2023-02-15 RX ADMIN — SODIUM CHLORIDE: 0.9 INJECTION, SOLUTION INTRAVENOUS at 09:02

## 2023-02-15 RX ADMIN — FAMOTIDINE 20 MG: 20 TABLET ORAL at 09:02

## 2023-02-15 RX ADMIN — TRANEXAMIC ACID 1000 MG: 100 INJECTION, SOLUTION INTRAVENOUS at 12:02

## 2023-02-15 RX ADMIN — NICARDIPINE HYDROCHLORIDE 0.4 MCG: 25 INJECTION INTRAVENOUS at 12:02

## 2023-02-15 RX ADMIN — PROPOFOL 150 MCG/KG/MIN: 10 INJECTION, EMULSION INTRAVENOUS at 11:02

## 2023-02-15 RX ADMIN — TRANEXAMIC ACID 1000 MG: 100 INJECTION, SOLUTION INTRAVENOUS at 01:02

## 2023-02-15 RX ADMIN — PREGABALIN 75 MG: 75 CAPSULE ORAL at 09:02

## 2023-02-15 RX ADMIN — LABETALOL HYDROCHLORIDE 10 MG: 5 INJECTION, SOLUTION INTRAVENOUS at 12:02

## 2023-02-15 RX ADMIN — MIDAZOLAM HYDROCHLORIDE 2 MG: 1 INJECTION, SOLUTION INTRAMUSCULAR; INTRAVENOUS at 11:02

## 2023-02-15 RX ADMIN — CEFAZOLIN 2 G: 2 INJECTION, POWDER, FOR SOLUTION INTRAMUSCULAR; INTRAVENOUS at 12:02

## 2023-02-15 RX ADMIN — FENTANYL CITRATE 25 MCG: 50 INJECTION INTRAMUSCULAR; INTRAVENOUS at 02:02

## 2023-02-15 RX ADMIN — ASPIRIN 81 MG: 81 TABLET, CHEWABLE ORAL at 09:02

## 2023-02-15 RX ADMIN — HYDROMORPHONE HYDROCHLORIDE 0.2 MG: 1 INJECTION, SOLUTION INTRAMUSCULAR; INTRAVENOUS; SUBCUTANEOUS at 02:02

## 2023-02-15 RX ADMIN — OXYCODONE HYDROCHLORIDE 10 MG: 10 TABLET ORAL at 09:02

## 2023-02-15 RX ADMIN — ACETAMINOPHEN 1000 MG: 500 TABLET ORAL at 09:02

## 2023-02-15 RX ADMIN — HYDROMORPHONE HYDROCHLORIDE 0.2 MG: 1 INJECTION, SOLUTION INTRAMUSCULAR; INTRAVENOUS; SUBCUTANEOUS at 03:02

## 2023-02-15 RX ADMIN — ACETAMINOPHEN 650 MG: 325 TABLET ORAL at 03:02

## 2023-02-15 RX ADMIN — OXYCODONE HYDROCHLORIDE 10 MG: 10 TABLET ORAL at 06:02

## 2023-02-15 RX ADMIN — METHOCARBAMOL 750 MG: 750 TABLET ORAL at 03:02

## 2023-02-15 RX ADMIN — LIDOCAINE HYDROCHLORIDE 75 MG: 20 INJECTION INTRAVENOUS at 11:02

## 2023-02-15 RX ADMIN — SODIUM CHLORIDE: 9 INJECTION, SOLUTION INTRAVENOUS at 12:02

## 2023-02-15 RX ADMIN — FENTANYL CITRATE 50 MCG: 50 INJECTION, SOLUTION INTRAMUSCULAR; INTRAVENOUS at 11:02

## 2023-02-15 RX ADMIN — MUPIROCIN 1 G: 20 OINTMENT TOPICAL at 09:02

## 2023-02-15 RX ADMIN — SODIUM CHLORIDE: 9 INJECTION, SOLUTION INTRAVENOUS at 10:02

## 2023-02-15 RX ADMIN — SODIUM CHLORIDE: 9 INJECTION, SOLUTION INTRAVENOUS at 09:02

## 2023-02-15 RX ADMIN — PROPOFOL 50 MG: 10 INJECTION, EMULSION INTRAVENOUS at 11:02

## 2023-02-15 RX ADMIN — NICARDIPINE HYDROCHLORIDE 0.6 MCG: 25 INJECTION INTRAVENOUS at 12:02

## 2023-02-15 RX ADMIN — VANCOMYCIN HYDROCHLORIDE 1500 MG: 1.5 INJECTION, POWDER, LYOPHILIZED, FOR SOLUTION INTRAVENOUS at 09:02

## 2023-02-15 RX ADMIN — SENNOSIDES AND DOCUSATE SODIUM 1 TABLET: 50; 8.6 TABLET ORAL at 09:02

## 2023-02-15 RX ADMIN — SODIUM CHLORIDE: 9 INJECTION, SOLUTION INTRAVENOUS at 04:02

## 2023-02-15 RX ADMIN — KETAMINE HYDROCHLORIDE 30 MG: 100 INJECTION INTRAMUSCULAR; INTRAVENOUS at 11:02

## 2023-02-15 RX ADMIN — ACETAMINOPHEN 650 MG: 325 TABLET ORAL at 09:02

## 2023-02-15 RX ADMIN — FAMOTIDINE 20 MG: 10 INJECTION, SOLUTION INTRAVENOUS at 12:02

## 2023-02-15 RX ADMIN — Medication 3.5 ML: at 11:02

## 2023-02-15 RX ADMIN — METHOCARBAMOL 750 MG: 750 TABLET ORAL at 09:02

## 2023-02-15 RX ADMIN — CELECOXIB 400 MG: 200 CAPSULE ORAL at 09:02

## 2023-02-15 RX ADMIN — ONDANSETRON 4 MG: 2 INJECTION INTRAMUSCULAR; INTRAVENOUS at 12:02

## 2023-02-15 RX ADMIN — CEFAZOLIN 2 G: 2 INJECTION, POWDER, FOR SOLUTION INTRAMUSCULAR; INTRAVENOUS at 09:02

## 2023-02-15 RX ADMIN — DEXAMETHASONE SODIUM PHOSPHATE 8 MG: 4 INJECTION, SOLUTION INTRAMUSCULAR; INTRAVENOUS at 12:02

## 2023-02-15 NOTE — OP NOTE
Ho Left TKA  OPERATIVE NOTE    Date of Operation:   2/15/2023    Providers Performing:   Surgeon(s):  Semaj Teague III, MD  Assistant: Ramírez Stanley MD    Operative Procedure:   Left Total Knee Arthroplasty, CPT 18959    Preoperative Diagnosis:   Left Knee Osteoarthritis, ICD-10 M17.12    Postoperative Diagnosis:   SAME    Components Used:   Depuy  Femur: Attune PS Size 4  Tibia: Attune fixed bearing Size 4  Patella: Attune Medialized Dome 35 mm  Tibial Insert: Attune fixed bearing PS inset size 7 mm  2 packs cement: Simplex P    Implant Name Type Inv. Item Serial No.  Lot No. LRB No. Used Action   CEMENT BONE SURG SMPLX P RADPQ - [id number]  CEMENT BONE SURG SMPLX P RADPQ  Split[employer] JINA. [id number] Left 2 Implanted   PATELLA ATTUNE MEDLZD DOME 35 - [id number]  PATELLA ATTUNE MEDLZD DOME 35  [employer] [id number] Left 1 Implanted   Attune Femoral Posterior Stabilized Size 4 Left Cemented      [id number] Left 1 Implanted   BASEPLATE ATTUNE TIB FIX BRIAN 4 - [id number]  BASEPLATE ATTUNE TIB FIX BRIAN 4  SYNTHES [id number] Left 1 Implanted   Attune Tibial Insert Fixed Bearing Posterior Stabilized Size 4 7mm AOX     [id number] Left 1 Implanted       Anesthesia:   Spinal    ETHEL cocktail: GOOD    Estimated Blood Loss:   50 cc    Specimens:   None    Complications:   None    Indications:   The patient has failed non-operative measures including medications, injections and activity modifications for their knee.  After an explanation of risks and benefits of knee arthroplasty surgery, the patient wishes to proceed with surgery.    Operative Notes:   The patient was greeted in the pre-op holding area.  The patients knee was marked with a surgical marker by the surgeon.  Spinal-Epidural anesthesia was administered by the anesthesia team.  The patient was placed in the supine position on the OR table with all bony prominences padded.  A tourniquet was inflated to 250mmHg for 52 minutes due to MAP >100 during positioning.   IV antibiotics were administered.  The leg was prepped and draped in the usual sterile fashion.  A timeout was performed and the correct patient, site and procedure were identified.    A midline incision was made with a standard medical parapatellar arthrotomy.  The standard medial capsular release was performed along with the lateral gutter.  The patella was mobilized from the lateral parapatellar ligament and bony osteophytes were removed.  The intercondylar notch was cleared of the ACL/PCL.    The extramedullary tibial alignment guide was placed in the appropriate slope and varus/valgus orientation and the proximal cutting block secured by pins.  Measured resection with a 9mm cut was accounted for from the high side of the plateau, perpendicular to the ankle.  The cut was made with an oscillating saw.  We then obtained access to the femoral canal with the stepped drill.  The intramedullary nelson was placed in 4 degrees of valgus with a 11mm planned resection.  Our distal femoral cuts were made.  The knee was placed in extension and the medical and lateral meniscal remnants removed.  A spacer block was placed with the knee in extension checking for alignment and balance which we were happy with.    The knee was then brought into flexion and the distal femoral gap assessed for appropriate rotation.  Our distal femoral sizing guide was placed and sized appropriately.  Guide pins were placed in the appropriate external rotation.  This was assessed with the 4 in 1 cutting block and the flexion gap sizing block which was appropriate (block shifted 2 anterior).  The distal femoral preparation was completed after the anterior, posterior and chamfer cuts were made.  The box cutting guide was placed and assessed.  The box cut was made.    At this time the trial implants were placed and knee assessed for stability, and flexion arc. The patella was prepared using free-hand technique and the three-holed lug drill guide was  sized and appropriately assessed. Patella tracking was found to be acceptable. The tibial component rotation was marked. The trials were removed. The tibial component was positioned and the keel was drilled and punched.    The wound was copiously irrigated with pulsitile lavage and the bony surfaces dried.  Cement was mixed on the back table and applied to all components.  Cementation of the femoral, tibial and patellar components was performed sequentially with removal of all excess cement. A trial insert was placed to provide compression while the cement dried and a 0.35% betadine solution was left to soak in the surgical field.     After the cement was dry, the trial poly insert was removed. Hemostasis was obtained with bovie electrocautery.  The knee was again copiously irrigated with pulsatile lavage. The final polyethylene insert was placed and the knee reduced.      1 gram of vancomycin powder was placed in the knee. The arthrotomy was closed with interrupted #1 vicryl suture and #2 quill, the subcuticular layer was closed with 2-0 vicryl suture and a running 4-0 monocryl was used to closed the skin surface.  Surgicel sealant was placed over the top of the incision and sterile dressing placed over the wound. Appropriately sized TEDs hose were placed for edema control.     Sponge and needle counts were correct.    The first-assistant was critical to all steps of the operation, including retraction and leg stabilization during exposure and bone preparation, as well as the deep and superficial wound closure.  Dr. Teague performed and/or supervised the key portions of this surgical procedure, including evaluation of the bone cuts, reshaping of the bony elements, and insertion of the provisional and final components.    Postoperative Plan:  DVT Prophylaxis: The patient will be anticoagulated with 81mg aspirin x1 dose @ 2100 on POD#0 then Eliquis 2.5mg BID x30 days starting 0900 POD#1    They will receive 24 hours  of post-operative antibiotics.    Activity will be weight bearing as tolerated.    Hold HRT 2-4 weeks  Rx vit D 50k units weekly   Celebrex 100mg daily x14 days  Tylenol 650mg BID   Rx lyrica 75mg BID #60  Continue prilosec and sucralfate        Signed by: Semaj Teague III, MD

## 2023-02-15 NOTE — ANESTHESIA PREPROCEDURE EVALUATION
02/15/2023  Licha Gonzales is a 57 y.o., female.    Procedure Summary    Case: [id number] Date/Time: 02/15/23 1100   Procedures:        ARTHROPLASTY, left KNEE, TOTAL Depuy-Attune NI Dr. Teague (Left: Knee)       ARTHROPLASTY, left KNEE (Left: Knee)   Anesthesia type: Regional   Diagnosis: Primary osteoarthritis of left knee [M17.12]     Patient Active Problem List   Diagnosis    Gastroesophageal reflux disease with esophagitis without hemorrhage    Obesity    Former smoker    Hormone replacement therapy (HRT)    Laboratory test    Prediabetes    WAYNE positive    Elevated BP without diagnosis of hypertension    Seasonal allergies    Snoring    IBS (irritable bowel syndrome)     Past Surgical History:   Procedure Laterality Date    HAND SURGERY Bilateral     Carpal tunnel surgery - both sides    HYSTERECTOMY      JOINT REPLACEMENT      left shoulder  2016    NOSE SURGERY      x's 2    TOTAL KNEE ARTHROPLASTY Right 2012    knee     Current Discharge Medication List      CONTINUE these medications which have NOT CHANGED    Details   pseudoephedrine HCl (SUDAFED ORAL) Take by mouth as needed. Allergies      acetaminophen (TYLENOL) 650 MG TbSR Take 1 tablet (650 mg total) by mouth every 8 (eight) hours.  Qty: 120 tablet, Refills: 0    Comments: Deliver to [place]. Surgery 2/15/2023  Associated Diagnoses: Status post left knee replacement      albuterol (PROVENTIL/VENTOLIN HFA) 90 mcg/actuation inhaler INHALE 1 TO 2 PUFFS BY MOUTH EVERY 4 TO 6 HOURS      aspirin (ECOTRIN) 81 MG EC tablet Take 1 tablet (81 mg total) by mouth 2 (two) times a day.  Qty: 60 tablet, Refills: 0    Comments: Deliver to [place]. Surgery 2/15/2023  Associated Diagnoses: Status post left knee replacement      biotin 10,000 mcg Cap Take by mouth once daily.      celecoxib (CELEBREX) 200 MG capsule Take 1 capsule (200 mg total) by  mouth once daily.  Qty: 30 capsule, Refills: 0    Comments: Deliver to [facility]. Surgery 2/15/2023  Associated Diagnoses: Status post left knee replacement      cyclobenzaprine (FLEXERIL) 10 MG tablet Take 10 mg by mouth every evening.      docusate sodium (COLACE) 100 MG capsule Take 1 capsule (100 mg total) by mouth 2 (two) times daily.  Qty: 60 capsule, Refills: 0    Comments: Deliver to [facility]. Surgery 2/15/2023  Associated Diagnoses: Status post left knee replacement      !! ergocalciferol (ERGOCALCIFEROL) 50,000 unit Cap Take 50,000 Units by mouth every 7 days. Tuesday      !! ergocalciferol (ERGOCALCIFEROL) 50,000 unit Cap Take 1 capsule (50,000 Units total) by mouth every 7 days. For 8 weeks  Qty: 8 capsule, Refills: 0    Comments: Deliver to [facility]. Surgery 2/15 2023  Associated Diagnoses: Status post left knee replacement      estradioL (ESTRACE) 0.5 MG tablet Take 1 tablet (0.5 mg total) by mouth once daily.  Qty: 30 tablet, Refills: 11    Associated Diagnoses: Hormone deficiency      fluticasone propionate (FLONASE) 50 mcg/actuation nasal spray 2 sprays by Each Nostril route once daily. Shake Liquid and use  As needed      methocarbamoL (ROBAXIN) 750 MG Tab Take 1 tablet (750 mg total) by mouth 4 (four) times daily as needed (muscle spasms).  Qty: 40 tablet, Refills: 0    Comments: Deliver to [facility]. Surgery 2/15/2023  Associated Diagnoses: Status post left knee replacement      montelukast (SINGULAIR) 10 mg tablet Take 10 mg by mouth every evening.      omeprazole (PRILOSEC) 40 MG capsule Take 40 mg by mouth once daily.      oxyCODONE (ROXICODONE) 5 MG immediate release tablet Take 1-2 tablets by mouth every 4-6 hours as needed for pain  Qty: 50 tablet, Refills: 0    Comments: Greater than 7 day supply is medically necessary. Deliver to [facility] for surgery 2/15/2023  Associated Diagnoses: Status post left knee replacement      sucralfate (CARAFATE) 1 gram tablet Take by mouth daily as needed. As  needed      traZODone (DESYREL) 100 MG tablet Take 100 mg by mouth nightly as needed for Insomnia.       !! - Potential duplicate medications found. Please discuss with provider.          Pre-op Assessment    I have reviewed the Patient Summary Reports.     I have reviewed the Nursing Notes. I have reviewed the NPO Status.   I have reviewed the Medications.     Review of Systems  Anesthesia Hx:  No problems with previous Anesthesia  Denies Family Hx of Anesthesia complications.   Denies Personal Hx of Anesthesia complications.   Social:  Former Smoker, Social Alcohol Use QUIT 8/15/22   Hematology/Oncology:     Oncology Normal   Hematology Comments: VITAMIN D DEFICIENT   EENT/Dental:   chronic allergic rhinitis Nasal Problems:  include Bilateral Nares WIDENED DRAINAGE HOLES   Cardiovascular:   Denies Hypertension.   Denies Angina. no hyperlipidemia  Denies MURRIETA.  Functional Capacity 4 METS    Pulmonary:   Denies Shortness of breath.  Denies Recent URI. Sleep Apnea She deferred sleep evaluation until post op   Renal/:  Renal/ Normal     Hepatic/GI:   Denies PUD. GERD, well controlled Denies Hepatitis.  Bowel Conditions:  Irritable Bowel Syndrome  Liver Disease, Fatty Liver    Musculoskeletal:  Joint Disease:  Arthritis, Osteoarthritis LEFT KNEE OSTEOARTHRITIS    HX OF LEFT SHOULDER ARTHROSCOPY    2012 RIGHT TKA     Bone Disorders: +WAYNE    OB/GYN/PEDS:  HX PCOS, HYSTERECTOMY.    HORMONE REPLACEMENT THERAPY   Neurological:   Denies TIA. Denies CVA. Headaches Denies Seizures. HX OF MIGRAINES Osteoarthritis    Endocrine:   Denies Diabetes. Denies Hypothyroidism. Denies Hyperthyroidism.  Obesity / BMI > 30  Dermatological:  Skin Normal    Psych:  Psychiatric Normal           Physical Exam  General: Well nourished, Cooperative, Alert and Oriented    Airway:  Mallampati: II   Mouth Opening: Normal  Tongue: Normal  Neck ROM: Normal ROM    Dental:  Intact    Chest/Lungs:  Clear to auscultation, Normal Respiratory  Rate    Heart:  Rate: Normal  Rhythm: Regular Rhythm  Sounds: Normal        2/14/2023 MEDICAL CLEARANCE  This visit was focused on Preoperative evaluation, Perioperative Medical management, complication reduction plans. I suggest that the patient follows up with primary care or relevant sub specialists for ongoing health care.     Patient is optimized     Patient/ care giver/ Family member was instructed to call and update me about any changes to health,  medication, office visits ,testing out side of the [facility] , hospitalizations between now and surgery       Shannon Bird MD  Internal Medicine  [facility]   Cell Phone- [phone]     History of COVID -no       Anesthesia Plan  Type of Anesthesia, risks & benefits discussed:    Anesthesia Type: Gen Natural Airway, Spinal  Intra-op Monitoring Plan: Standard ASA Monitors  Post Op Pain Control Plan: multimodal analgesia and IV/PO Opioids PRN  Induction:  IV  Airway Plan: Direct  Informed Consent: Informed consent signed with the Patient and all parties understand the risks and agree with anesthesia plan.  All questions answered.   ASA Score: 2    Ready For Surgery From Anesthesia Perspective.       .

## 2023-02-15 NOTE — ANESTHESIA PROCEDURE NOTES
Spinal    Diagnosis: Left knee osteoarthritis  Patient location during procedure: OR  Start time: 2/15/2023 11:53 AM  Timeout: 2/15/2023 11:52 AM  End time: 2/15/2023 11:58 AM    Staffing  Authorizing Provider: Marie Gonzalez MD  Performing Provider: Marie Gonzalez MD    Preanesthetic Checklist  Completed: patient identified, IV checked, site marked, risks and benefits discussed, surgical consent, monitors and equipment checked, pre-op evaluation and timeout performed  Spinal Block  Patient position: sitting  Prep: ChloraPrep  Patient monitoring: heart rate, cardiac monitor, continuous pulse ox, continuous capnometry and frequent blood pressure checks  Approach: midline  Location: L3-4  Injection technique: single shot  CSF Fluid: clear free-flowing CSF and blood-tinged free-flowing CSF  Needle  Needle localization: anatomical landmarks  Assessment  Sensory level: T8   Dermatomal levels determined by alcohol wipe  Ease of block: easy  Patient's tolerance of the procedure: comfortable throughout block and no complaints  Medications:    Medications: mepivacaine (CARBOCAINE) injection 15 mg/mL (1.5%) - Other   3.5 mL - 2/15/2023 11:55:00 AM

## 2023-02-15 NOTE — PLAN OF CARE
Nursing pre-op complete. Pt calm, will continue to monitor. Spouse visiting at bedside. Spouse states pt's walker is in her in-patient room on 3rd floor. Spouse states will keep pt's clothing.

## 2023-02-15 NOTE — ASSESSMENT & PLAN NOTE
Licha Gonzales is a 57 y.o. female is s/p L TKA, on 2/15/2023    Surgical dressing C/D/I, ice pack in place   Pain control: multimodal, Anesthesia Surgical Home following  PT/OT: WBAT LLE  DVT PPx: ASA 81mg x 1 @ 2100 POD0, Eliquis 2.4 BID x30 days starting POD1, FCDs at all times when not ambulating  Abx: postop Ancef  Drain: none  Bhatt: none    Dispo: plan to dc to home today once cleared by PT/OT

## 2023-02-15 NOTE — HPI
CC:  Left knee pain     Licha Gonzales is a 57 y.o. female with history of Left knee pain. Pain is worse with activity and weight bearing.  Patient has experienced interference of activities of daily living due to decreased range of motion and an increase in joint pain and swelling.  Patient has failed non-operative treatment including NSAIDs, corticosteroid injections, viscosupplement injections, and activity modification.  Licha Gonzales currently ambulates independently.      Relevant medical conditions of significance in perioperative period:  History or right knee replacement  GERD- on prilosec

## 2023-02-15 NOTE — HOSPITAL COURSE
On 2/15/2023, the patient arrived to the [facility] pre-operative management.  Upon completion of pre-operative preparation, the patient was taken back to the operative theatre. L TKA was performed without complication and the patient was transported to the post anesthesia care unit in stable condition.  After appropriate recovery from the anaesthetic agents used during the surgery, the patient was then transported to the hospital inpatient floor.  The interim of the hospital stay from arrival on the floor up to discharge has been uncomplicated. The patient has tolerated regular diet.  The patient's pain has been controlled using a multimodal approach. Currently, the patient's pain is well controlled on an oral regimen.  The patient has been voiding without difficulty.  The patient began participation in physical therapy after surgery and has progressed throughout the entire hospital stay.  Currently, the patient's progress is sufficient to allow the them to be discharged to home safely.  The patient agrees with this assessment and desires a discharge today.

## 2023-02-15 NOTE — PT/OT/SLP PROGRESS
Physical Therapy      Patient Name:  Licha Gonzales   MRN:  [id number]    Patient not seen today secondary to OT with patient on 1st attempt and patient eating on 2nd attempt. Will follow-up again in AM.

## 2023-02-15 NOTE — NURSING TRANSFER
Nursing Transfer Note      2/15/2023     Reason patient is being transferred: extended recovery.    Transfer To: recovery suites from pacu.    Transfer via bed    Transfer with ivf.    Transported by SHAHEED Cohn and SHAHEED Shukla    Medicines sent: mupirocin.    Any special needs or follow-up needed: pain.    Chart send with patient: Yes    Notified: spouse

## 2023-02-15 NOTE — TRANSFER OF CARE
"Anesthesia Transfer of Care Note    Patient: Licha Gonzales    Procedure(s) Performed: Procedure(s) (LRB):  ARTHROPLASTY, left KNEE, TOTAL Depuy-Attune NI Dr. Teague (Left)  ARTHROPLASTY, left KNEE (Left)    Patient location: PACU    Anesthesia Type: general    Transport from OR: Transported from OR on 6-10 L/min O2 by face mask with adequate spontaneous ventilation    Post pain: adequate analgesia    Post assessment: no apparent anesthetic complications and tolerated procedure well    Post vital signs: stable    Level of consciousness: awake and alert    Nausea/Vomiting: no nausea/vomiting    Complications: none    Transfer of care protocol was followed      Last vitals:   Visit Vitals  BP (!) 136/99 (BP Location: Right arm, Patient Position: Lying)   Pulse 96   Temp 36.2 °C (97.2 °F) (Tympanic)   Resp 18   Ht 5' 5" (1.651 m)   Wt 97.5 kg (215 lb)   SpO2 98%   Breastfeeding No   BMI 35.78 kg/m²     "

## 2023-02-16 ENCOUNTER — DOCUMENTATION ONLY (OUTPATIENT)
Dept: ORTHOPEDICS | Facility: CLINIC | Age: 58
End: 2023-02-16
Payer: COMMERCIAL

## 2023-02-16 VITALS
HEART RATE: 81 BPM | WEIGHT: 215 LBS | HEIGHT: 65 IN | TEMPERATURE: 98 F | RESPIRATION RATE: 16 BRPM | SYSTOLIC BLOOD PRESSURE: 136 MMHG | DIASTOLIC BLOOD PRESSURE: 66 MMHG | BODY MASS INDEX: 35.82 KG/M2 | OXYGEN SATURATION: 97 %

## 2023-02-16 PROCEDURE — 97110 THERAPEUTIC EXERCISES: CPT

## 2023-02-16 PROCEDURE — 63600175 PHARM REV CODE 636 W HCPCS: Performed by: NURSE PRACTITIONER

## 2023-02-16 PROCEDURE — 97535 SELF CARE MNGMENT TRAINING: CPT

## 2023-02-16 PROCEDURE — 97161 PT EVAL LOW COMPLEX 20 MIN: CPT

## 2023-02-16 PROCEDURE — 25000003 PHARM REV CODE 250: Performed by: NURSE PRACTITIONER

## 2023-02-16 PROCEDURE — 94761 N-INVAS EAR/PLS OXIMETRY MLT: CPT

## 2023-02-16 PROCEDURE — 25000003 PHARM REV CODE 250: Performed by: STUDENT IN AN ORGANIZED HEALTH CARE EDUCATION/TRAINING PROGRAM

## 2023-02-16 PROCEDURE — 97116 GAIT TRAINING THERAPY: CPT

## 2023-02-16 PROCEDURE — 99900035 HC TECH TIME PER 15 MIN (STAT)

## 2023-02-16 PROCEDURE — 99232 SBSQ HOSP IP/OBS MODERATE 35: CPT | Mod: COE,,, | Performed by: ANESTHESIOLOGY

## 2023-02-16 PROCEDURE — 97530 THERAPEUTIC ACTIVITIES: CPT

## 2023-02-16 PROCEDURE — 99232 PR SUBSEQUENT HOSPITAL CARE,LEVL II: ICD-10-PCS | Mod: COE,,, | Performed by: ANESTHESIOLOGY

## 2023-02-16 RX ORDER — CELECOXIB 100 MG/1
100 CAPSULE ORAL DAILY
Status: DISCONTINUED | OUTPATIENT
Start: 2023-02-16 | End: 2023-02-16 | Stop reason: HOSPADM

## 2023-02-16 RX ADMIN — CELECOXIB 100 MG: 100 CAPSULE ORAL at 08:02

## 2023-02-16 RX ADMIN — FAMOTIDINE 20 MG: 20 TABLET ORAL at 08:02

## 2023-02-16 RX ADMIN — MUPIROCIN 1 G: 20 OINTMENT TOPICAL at 08:02

## 2023-02-16 RX ADMIN — METHOCARBAMOL 750 MG: 750 TABLET ORAL at 08:02

## 2023-02-16 RX ADMIN — CEFAZOLIN 2 G: 2 INJECTION, POWDER, FOR SOLUTION INTRAMUSCULAR; INTRAVENOUS at 04:02

## 2023-02-16 RX ADMIN — OXYCODONE 5 MG: 5 TABLET ORAL at 08:02

## 2023-02-16 RX ADMIN — OXYCODONE HYDROCHLORIDE 10 MG: 10 TABLET ORAL at 12:02

## 2023-02-16 RX ADMIN — PREGABALIN 75 MG: 75 CAPSULE ORAL at 08:02

## 2023-02-16 RX ADMIN — OXYCODONE HYDROCHLORIDE 10 MG: 10 TABLET ORAL at 11:02

## 2023-02-16 RX ADMIN — OXYCODONE HYDROCHLORIDE 10 MG: 10 TABLET ORAL at 05:02

## 2023-02-16 RX ADMIN — ACETAMINOPHEN 650 MG: 325 TABLET ORAL at 08:02

## 2023-02-16 RX ADMIN — POLYETHYLENE GLYCOL 3350 17 G: 17 POWDER, FOR SOLUTION ORAL at 08:02

## 2023-02-16 RX ADMIN — SENNOSIDES AND DOCUSATE SODIUM 1 TABLET: 50; 8.6 TABLET ORAL at 08:02

## 2023-02-16 RX ADMIN — APIXABAN 2.5 MG: 2.5 TABLET, FILM COATED ORAL at 08:02

## 2023-02-16 NOTE — PLAN OF CARE
Problem: Adult Inpatient Plan of Care  Goal: Plan of Care Review  2/16/2023 0554 by Amaya Obrien RN  Outcome: Ongoing, Progressing  Flowsheets (Taken 2/16/2023 0554)  Plan of Care Reviewed With:   patient   spouse  2/16/2023 0549 by Amaya Obrien RN  Outcome: Ongoing, Progressing  Flowsheets (Taken 2/16/2023 0549)  Plan of Care Reviewed With: patient  Goal: Patient-Specific Goal (Individualized)  2/16/2023 0554 by Amaya Obrien RN  Outcome: Ongoing, Progressing  Flowsheets (Taken 2/16/2023 0554)  Anxieties, Fears or Concerns: Generalized  Individualized Care Needs: Pain managemement  2/16/2023 0549 by Amaya Obrien RN  Outcome: Ongoing, Progressing  Flowsheets (Taken 2/16/2023 0549)  Anxieties, Fears or Concerns: Generalized  Individualized Care Needs: Pain management     Problem: Pain Acute  Goal: Acceptable Pain Control and Functional Ability  2/16/2023 0554 by Amaya Obrien RN  Outcome: Ongoing, Progressing  2/16/2023 0549 by Amaya Obrien RN  Outcome: Ongoing, Progressing  Intervention: Develop Pain Management Plan  2/16/2023 0554 by Amaya Obrien RN  Flowsheets (Taken 2/16/2023 0554)  Pain Management Interventions:   care clustered   cold applied   pain management plan reviewed with patient/caregiver   medication offered   quiet environment facilitated   relaxation techniques promoted  2/16/2023 0549 by Amaya Obrien RN  Flowsheets (Taken 2/16/2023 0549)  Pain Management Interventions:   care clustered   cold applied   medication offered but refused   pain management plan reviewed with patient/caregiver   position adjusted   quiet environment facilitated   relaxation techniques promoted  Intervention: Prevent or Manage Pain  2/16/2023 0554 by Amaya Obrien RN  Flowsheets (Taken 2/16/2023 0554)  Sleep/Rest Enhancement:   awakenings minimized   noise level reduced   relaxation techniques promoted   room darkened  Sensory Stimulation Regulation:   auditory  stimulation minimized   quiet environment promoted   visual stimulation minimized   care clustered  Bowel Elimination Promotion: adequate fluid intake promoted  Medication Review/Management:   medications reviewed   high-risk medications identified  2/16/2023 0549 by Amaya Obrien RN  Flowsheets (Taken 2/16/2023 0549)  Sleep/Rest Enhancement:   awakenings minimized   relaxation techniques promoted   room darkened  Sensory Stimulation Regulation:   auditory stimulation minimized   quiet environment promoted   visual stimulation minimized   care clustered   lighting decreased  Bowel Elimination Promotion: adequate fluid intake promoted  Medication Review/Management:   medications reviewed   high-risk medications identified     Problem: Bleeding (Knee Arthroplasty)  Goal: Absence of Bleeding  2/16/2023 0554 by Amaya Obrien RN  Outcome: Ongoing, Progressing  2/16/2023 0549 by Amaya Obrien RN  Outcome: Ongoing, Progressing  Intervention: Monitor and Manage Bleeding  2/16/2023 0554 by Amaya Obrien RN  Flowsheets (Taken 2/16/2023 0554)  Bleeding Management: dressing monitored  2/16/2023 0549 by Amaya Obrien RN  Flowsheets (Taken 2/16/2023 0549)  Bleeding Management: dressing monitored     Problem: Fall Injury Risk  Goal: Absence of Fall and Fall-Related Injury  2/16/2023 0554 by Amaya Obrien RN  Outcome: Ongoing, Progressing  2/16/2023 0549 by Amaya Obrien RN  Outcome: Ongoing, Progressing  Intervention: Identify and Manage Contributors  2/16/2023 0554 by Amaya Obrien RN  Flowsheets (Taken 2/16/2023 0554)  Self-Care Promotion:   BADL personal objects within reach   safe use of adaptive equipment encouraged  Medication Review/Management:   medications reviewed   high-risk medications identified  2/16/2023 0549 by Amaya Obrien RN  Flowsheets (Taken 2/16/2023 0549)  Self-Care Promotion:   BADL personal objects within reach   safe use of adaptive equipment  encouraged  Medication Review/Management:   medications reviewed   high-risk medications identified     Problem: Bleeding (Knee Arthroplasty)  Goal: Absence of Bleeding  2/16/2023 0554 by Amaya Obrien RN  Outcome: Ongoing, Progressing  2/16/2023 0549 by Amaya Obrien RN  Outcome: Ongoing, Progressing  Intervention: Monitor and Manage Bleeding  2/16/2023 0554 by Amaya Obrien RN  Flowsheets (Taken 2/16/2023 0554)  Bleeding Management: dressing monitored  2/16/2023 0549 by Amaya Obrien RN  Flowsheets (Taken 2/16/2023 0549)  Bleeding Management: dressing monitored

## 2023-02-16 NOTE — PLAN OF CARE
[facility] ([facility])  Discharge Final Note    Primary Care Provider: Clint Lindsey MD    Expected Discharge Date: 2/16/2023    Final Discharge Note (most recent)       Final Note - 02/16/23 1440          Final Note    Assessment Type Final Discharge Note     Anticipated Discharge Disposition Home or Self Care     What phone number can be called within the next 1-3 days to see how you are doing after discharge? --   [phone]    Hospital Resources/Appts/Education Provided Appointments scheduled and added to AVS                     Important Message from Medicare           Future Appointments   Date Time Provider Department Center   2/17/2023  9:00 AM Rosa Hart, PT [facility]   2/20/2023  7:00 AM Rosa Hart, PT [facility]   2/20/2023 11:00 AM Angie Fernandez NP [facility] [place]     Patient discharged home to care of family on 2/16/23. Outpatient PT to begin on 2/17/23 @ 9AM.      Kristal Domínguez RNCM  Case Management  [facility]  [phone]

## 2023-02-16 NOTE — PROGRESS NOTES
"[facility])  Orthopedics  Progress Note    Patient Name: Licha Gonzales  MRN: [id number]  Admission Date: 2/15/2023  Hospital Length of Stay: 1 days  Attending Provider: Semaj Teague III, MD  Primary Care Provider: Clint Lindsey MD  Follow-up For: Procedure(s) (LRB):  ARTHROPLASTY, left KNEE, TOTAL Depuy-Attune NI Dr. Teague (Left)  ARTHROPLASTY, left KNEE (Left)    Post-Operative Day: 1 Day Post-Op  Subjective:     Principal Problem:Primary osteoarthritis of left knee    Principal Orthopedic Problem: Same    Interval History: NAEON, patient stable, pain controlled. No acute complaints this morning. Not seen by PT because she was eating and working with OT during attempts. Dressings CDI.     Review of patient's allergies indicates:   Allergen Reactions    Biaxin [clarithromycin] Rash       Current Facility-Administered Medications   Medication    0.9%  NaCl infusion    acetaminophen tablet 650 mg    apixaban tablet 2.5 mg    aspirin chewable tablet 81 mg    bisacodyL suppository 10 mg    celecoxib capsule 100 mg    famotidine tablet 20 mg    methocarbamoL tablet 750 mg    morphine injection 2 mg    mupirocin 2 % ointment 1 g    naloxone 0.4 mg/mL injection 0.02 mg    ondansetron injection 4 mg    oxyCODONE immediate release tablet 5 mg    oxyCODONE immediate release tablet Tab 10 mg    polyethylene glycol packet 17 g    pregabalin capsule 75 mg    prochlorperazine injection Soln 5 mg    senna-docusate 8.6-50 mg per tablet 1 tablet     Objective:     Vital Signs (Most Recent):  Temp: 98.1 °F (36.7 °C) (02/16/23 0427)  Pulse: 88 (02/16/23 0427)  Resp: 16 (02/16/23 0542)  BP: (!) 118/58 (02/16/23 0427)  SpO2: 96 % (02/16/23 0427)   Vital Signs (24h Range):  Temp:  [97.2 °F (36.2 °C)-98.2 °F (36.8 °C)] 98.1 °F (36.7 °C)  Pulse:  [80-96] 88  Resp:  [9-23] 16  SpO2:  [92 %-99 %] 96 %  BP: (103-182)/() 118/58     Weight: 97.5 kg (215 lb)  Height: 5' 5" (165.1 cm)  Body " mass index is 35.78 kg/m².      Intake/Output Summary (Last 24 hours) at 2/16/2023 0605  Last data filed at 2/16/2023 0213  Gross per 24 hour   Intake 2710 ml   Output 1900 ml   Net 810 ml       Ortho/SPM Exam  AAOx4  NAD  Reg rate  No increased WOB    LLE:  Dressing c/d/i  Ice in place  SILT T/SP/DP/Myers/Sa  Motor intact T/SP/DP  WWP extremities  FCDs in place and functioning    Significant Labs:   Recent Lab Results       None          All pertinent labs within the past 24 hours have been reviewed.    Significant Imaging: I have reviewed and interpreted all pertinent imaging results/findings.    Assessment/Plan:     * Primary osteoarthritis of left knee  Licha Gonzales is a 57 y.o. female is s/p L TKA, on 2/15/2023    Surgical dressing C/D/I, ice pack in place   Pain control: multimodal, Anesthesia Surgical Home following  PT/OT: WBAT LLE  DVT PPx: ASA 81mg x 1 @ 2100 POD0, Eliquis 2.4 BID x30 days starting POD1, FCDs at all times when not ambulating  Abx: postop Ancef  Drain: none  Bhatt: none    Dispo: plan to dc to home today once cleared by PT/OT            Jim Stanley MD  Orthopedics  [facility])

## 2023-02-16 NOTE — PT/OT/SLP EVAL
Occupational Therapy   Evaluation    Name: Licha Gonzales  MRN: [id number]  Admitting Diagnosis: Primary osteoarthritis of left knee  Recent Surgery: Procedure(s) (LRB):  ARTHROPLASTY, left KNEE, TOTAL Depuy-Attune NI Dr. Teague (Left)  ARTHROPLASTY, left KNEE (Left) Day of Surgery    Recommendations:     Discharge Recommendations: home  Discharge Equipment Recommendations:  none  Barriers to discharge:  None    Assessment:     Licha Gonzales is a 57 y.o. female with a medical diagnosis of Primary osteoarthritis of left knee.  She presents with L TKA and is WBAT L LE.  Pt was able to perform supine/sit, sit/stand, and bed/chair T/F c CGA and RW.  Able to walk to bathroom c CGA and RW.  Required max time to walk to bathroom.  Pt is progressing well. Performance deficits affecting function: impaired self care skills, impaired functional mobility, orthopedic precautions.      Rehab Prognosis: Good; patient would benefit from acute skilled OT services to address these deficits and reach maximum level of function.       Plan:     Patient to be seen daily to address the above listed problems via self-care/home management, therapeutic activities, therapeutic exercises  Plan of Care Expires: 02/16/23  Plan of Care Reviewed with: patient, spouse    Subjective     Chief Complaint: Pt is s/p L TKA and is WBAT L LE  Patient/Family Comments/goals: Can I use crutches?    Occupational Profile:  Living Environment: Pt lives in a one story house c 1 PACO and has a tub/shower combo.  Previous level of function: I PTA  Roles and Routines: Pt works in [employer] at [employer]  Equipment Used at Home: walker, rolling, shower chair, bedside commode  Assistance upon Discharge: Pt lives c her     Pain/Comfort:  Pain Rating 1: 6/10    Patients cultural, spiritual, Scientologist conflicts given the current situation: no    Objective:     Communicated with: RN prior to session.  Patient found supine with cryotherapy, FCD, peripheral IV upon OT entry  to room.    General Precautions: Standard, fall  Orthopedic Precautions: LLE weight bearing as tolerated  Braces: N/A  Respiratory Status: Room air    Occupational Performance:    Bed Mobility:    Patient completed Supine to Sit with contact guard assistance    Functional Mobility/Transfers:  Patient completed Sit <> Stand Transfer with contact guard assistance  with  rolling walker   Patient completed Bed <> Chair Transfer using Stand Pivot technique with contact guard assistance with rolling walker  Patient completed Toilet Transfer Stand Pivot technique with contact guard assistance with  rolling walker and bedside commode  Functional Mobility: Pt was able to walk to bathroom c CGA and RW.    Activities of Daily Living:  Grooming: contact guard assistance Pt was able to wash her face while standing at sink c RW.  Upper Body Dressing: minimum assistance to don hospital gown.  Toileting: contact guard assistance for toilet hygiene using BSC      Physical Exam:  Upper Extremity Range of Motion:     -       Right Upper Extremity: WFL  -       Left Upper Extremity: WFL  Upper Extremity Strength:    -       Right Upper Extremity: WFL  -       Left Upper Extremity: WFL    AMPAC 6 Click ADL:  AMPAC Total Score: 15    Patient left up in chair with all lines intact, call button in reach, and RN notified    GOALS:   Multidisciplinary Problems       Occupational Therapy Goals          Problem: Occupational Therapy    Goal Priority Disciplines Outcome Interventions   Occupational Therapy Goal     OT, PT/OT Ongoing, Progressing    Description: Goals to be met by: 2/16/23     Patient will increase functional independence with ADLs by performing:    UE Dressing with Modified [place].  LE Dressing with Modified [place] and Assistive Devices as needed.  Grooming while standing at sink with Modified [place].  Toileting from bedside commode with Modified [place] for hygiene and clothing management.   Bathing from   shower chair/bench with Modified [place].  Toilet transfer to bedside commode with Modified [place].                         History:     Past Medical History:   Diagnosis Date    Arthritis     GERD (gastroesophageal reflux disease)     IBS (irritable bowel syndrome)          Past Surgical History:   Procedure Laterality Date    HAND SURGERY Bilateral     Carpal tunnel surgery - both sides    HYSTERECTOMY      JOINT REPLACEMENT      left shoulder  2016    NOSE SURGERY      x's 2    TOTAL KNEE ARTHROPLASTY Right 2012    knee       Time Tracking:     OT Date of Treatment: 02/15/23  OT Start Time: 1600  OT Stop Time: 1645  OT Total Time (min): 45 min    Billable Minutes:Evaluation 15  Self Care/Home Management 15  Therapeutic Activity 15    2/15/2023

## 2023-02-16 NOTE — PLAN OF CARE
Problem: Adult Inpatient Plan of Care  Goal: Plan of Care Review  Outcome: Ongoing, Progressing  Flowsheets (Taken 2/16/2023 0815)  Plan of Care Reviewed With: patient     Problem: Adult Inpatient Plan of Care  Goal: Patient-Specific Goal (Individualized)  Outcome: Ongoing, Progressing  Flowsheets (Taken 2/16/2023 0815)  Anxieties, Fears or Concerns: General  Individualized Care Needs: Pain managment     Problem: Fall Injury Risk  Goal: Absence of Fall and Fall-Related Injury  Intervention: Identify and Manage Contributors  Flowsheets (Taken 2/16/2023 0815)  Self-Care Promotion: BADL personal objects within reach  Medication Review/Management: medications reviewed      PT received AAO x 4. VSS. Bed locked and in lowest position. Oriented to room and callbell.  IV intact and infusing free of redness and irritation. Fall precautions maintained. Non skid socks on while out of bed. Pt instructed to call for assistance, skin integrity maintained,  ice maintained . No other complaints or concerns, will continue to follow careplan. Callbell placed within reach and use encouraged.

## 2023-02-16 NOTE — PLAN OF CARE
Problem: Occupational Therapy  Goal: Occupational Therapy Goal  Description: Goals to be met by: 2/16/23     Patient will increase functional independence with ADLs by performing:    UE Dressing with Modified [place].  LE Dressing with Modified [place] and Assistive Devices as needed.  Grooming while standing at sink with Modified [place].  Toileting from bedside commode with Modified [place] for hygiene and clothing management.   Bathing from  shower chair/bench with Modified [place].  Toilet transfer to bedside commode with Modified [place].    Outcome: Ongoing, Progressing

## 2023-02-16 NOTE — PT/OT/SLP EVAL
"Physical Therapy Evaluation, Treatment, and Discharge Note    Patient Name:  Licha Gonzales   MRN:  [id number]    Recommendations:     Discharge Recommendations: outpatient PT  Discharge Equipment Recommendations: none   Barriers to discharge: staying at the [facility] prior to going home on Monday    Assessment:     Licha Gonzales is a 57 y.o. female admitted with a medical diagnosis of Primary osteoarthritis of left knee. Patient tolerated PT session well. Patient ambulated 230ft with RW and supervision. No LOB or SOB noted. Patient ascended/descended 6" curb step with RW and contact guard assistance. Patient performed L LE therex x10 reps. Patient has an OPPT appointment on 2/17/2023. Patient ready to discharge home from PT standpoint.     Recent Surgery: Procedure(s) (LRB):  ARTHROPLASTY, left KNEE, TOTAL Depuy-Attandres Teague (Left)  ARTHROPLASTY, left KNEE (Left) 1 Day Post-Op    Plan:     During this hospitalization, patient does not require further acute PT services.  Please re-consult if situation changes.      Subjective     Chief Complaint: Left knee pain but switched up pain medication to see if it will help.   Patient/Family Comments/goals: To walk without pain.   Pain/Comfort:  Pain Rating 1:  (yes but did not rate with number)  Location - Side 1: Left  Location 1: knee  Pain Addressed 1: Pre-medicate for activity, Reposition, Distraction    Patients cultural, spiritual, Quaker conflicts given the current situation:  n/a    Living Environment:  Patient lives in a [facility] with 1 step to enter. Prior to admission, patients level of function was independent for functional mobility. She works in the [employer] at [employer].  Equipment at home: bedside commode, walker, rolling, shower chair, grab bar. Upon discharge, patient will have assistance from .    Objective:     Communicated with RN prior to session.  Patient found up in chair with cryotherapy upon PT entry to room.  present in room. " "    General Precautions: Standard, fall    Orthopedic Precautions:LLE weight bearing as tolerated   Braces: N/A  Respiratory Status: Room air    Exams:  Cognitive Exam:  Patient is oriented to Person, Place, Time, and Situation  Gross Motor Coordination:  WFL  Sensation:    -       Intact  RLE ROM: WFL  RLE Strength: WFL  LLE ROM: WFL except limited at knee due to pain  LLE Strength: appears WFL but did not formally assess due to pain    Functional Mobility:  Mat Mobility:     Supine to Sit: supervision  Sit to Supine: supervision  Transfers:     Sit to Stand:  supervision with rolling walker x1 from bedside chair and x1 from mat   Gait: Patient ambulated 230ft with Rolling Walker and supervision using 3-point gait. Patient demonstrated decreased neeru and decreased step length during gait due to pain, decreased ROM, and decreased strength.  Stairs: Patient ascended/descended 6" curb step with RW and contact guard assistance.        Treatment and Education:  Patient educated in and performed L LE exercises x10 reps for ankle pumps, quad set, glute set, SAQ over bolster, heel slides, hip abd/add, SLR, and LAQ. Patient stated that she knows she needs to do all her exercises because she had to have a manipulation after her right knee due to her scar tissue.      Patient educated in:  -PT role and POC  -safety with transfers including hand placement  -gait sequencing and RW management  -OOB activity to maximize recovery including ambulating at home to prevent DVT   -car transfer  -curb training  -HEP for therex at home with handout provided       AM-PAC 6 CLICK MOBILITY  Total Score:23     Patient left up in chair with all lines intact, call button in reach, RN notified, and  present.    GOALS:   Multidisciplinary Problems       Physical Therapy Goals       Not on file              Multidisciplinary Problems (Resolved)          Problem: Physical Therapy    Goal Priority Disciplines Outcome Goal Variances " Interventions   Physical Therapy Goal   (Resolved)     PT, PT/OT Met                         History:     Past Medical History:   Diagnosis Date    Arthritis     GERD (gastroesophageal reflux disease)     IBS (irritable bowel syndrome)        Past Surgical History:   Procedure Laterality Date    HAND SURGERY Bilateral     Carpal tunnel surgery - both sides    HYSTERECTOMY      JOINT REPLACEMENT      KNEE ARTHROPLASTY Left 2/15/2023    Procedure: ARTHROPLASTY, left KNEE;  Surgeon: Semaj Teauge III, MD;  Location: [facility];  Service: Orthopedics;  Laterality: Left;    left shoulder  2016    NOSE SURGERY      x's 2    TOTAL KNEE ARTHROPLASTY Right 2012    knee    TOTAL KNEE ARTHROPLASTY Left 2/15/2023    Procedure: ARTHROPLASTY, left KNEE, TOTAL Depuy-Attune NI Dr. Teague;  Surgeon: Semaj Teague III, MD;  Location: [facility];  Service: Orthopedics;  Laterality: Left;       Time Tracking:     PT Received On: 02/16/23  PT Start Time: 1109     PT Stop Time: 1140  PT Total Time (min): 31 min     Billable Minutes: Evaluation 10, Gait Training 11, and Therapeutic Exercise 10      02/16/2023

## 2023-02-16 NOTE — PT/OT/SLP PROGRESS
Occupational Therapy   Treatment and Discharge Note    Name: Licha Gonzales  MRN: [id number]  Admitting Diagnosis:  Primary osteoarthritis of left knee  1 Day Post-Op    Recommendations:     Discharge Recommendations: home  Discharge Equipment Recommendations:  none  Barriers to discharge:  None    Assessment:     Licha Gonzales is a 57 y.o. female with a medical diagnosis of Primary osteoarthritis of left knee.  She presents with L TKA and is WBAT L LE.  Pt was able to perform supine/sit, sit/stand, and walk to bathroom c CGA and RW.  Charlie to perform toilet hygiene and grooming c mod I and RW.  Able to perform UB/LB dressing c mod I.  Educated pt on bathing, car T/F's, and cryotherapy.  Pt is progressing well and will D/C from inpatient OT. Performance deficits affecting function are impaired self care skills, impaired functional mobility, orthopedic precautions.     Rehab Prognosis:  Good; patient would benefit from acute skilled OT services to address these deficits and reach maximum level of function.       Plan:     Patient to be seen daily to address the above listed problems via self-care/home management, therapeutic activities, therapeutic exercises  Plan of Care Expires: 02/16/23  Plan of Care Reviewed with: patient    Subjective     Pain/Comfort:  Pain Rating 1: 5/10    Objective:     Communicated with: RN prior to session.  Patient found supine with cryotherapy, FCD upon OT entry to room.    General Precautions: Standard, fall    Orthopedic Precautions:LLE weight bearing as tolerated  Braces: N/A  Respiratory Status: Room air     Occupational Performance:     Bed Mobility:    Patient completed Supine to Sit with supervision     Functional Mobility/Transfers:  Patient completed Sit <> Stand Transfer with contact guard assistance  with  rolling walker   Patient completed Bed <> Chair Transfer using Stand Pivot technique with contact guard assistance with rolling walker  Patient completed Toilet Transfer Stand  Pivot technique with contact guard assistance with  rolling walker  Functional Mobility: Pt was able to walk to bathroom c CGA and RW.    Activities of Daily Living:  Grooming: supervision to brush teeth and wash off face while standing at sink  Upper Body Dressing: mod I to don pullover dress.  Lower Body Dressing: modified independence to don underwear, socks, and shoes.  Toileting: modified independence for toilet hygiene.      [facility] 6 Click ADL: 24    Patient left up in chair with all lines intact, call button in reach, RN notified, and  present    GOALS:   Multidisciplinary Problems       Occupational Therapy Goals       Not on file              Multidisciplinary Problems (Resolved)          Problem: Occupational Therapy    Goal Priority Disciplines Outcome Interventions   Occupational Therapy Goal   (Resolved)     OT, PT/OT Met    Description: Goals to be met by: 2/16/23     Patient will increase functional independence with ADLs by performing:    UE Dressing with Modified [place].  LE Dressing with Modified [place] and Assistive Devices as needed.  Grooming while standing at sink with Modified [place].  Toileting from bedside commode with Modified [place] for hygiene and clothing management.   Bathing from  shower chair/bench with Modified [place].  Toilet transfer to bedside commode with Modified [place].                         Time Tracking:     OT Date of Treatment: 02/16/23  OT Start Time: 0900  OT Stop Time: 0944  OT Total Time (min): 44 min    Billable Minutes:Self Care/Home Management 30  Therapeutic Activity 14               2/16/2023

## 2023-02-16 NOTE — NURSING
Pt arrived to unit via hospital bed from PACU.  Pt aaox4, vss, no s/s of distress noted.  Dressing to left knee cdi.  Accutherm in place.  Pt instructed to call for assistance when getting up and she verbalized understanding.  Call light placed.  Spouse at bedside.

## 2023-02-16 NOTE — PLAN OF CARE
[facility] [facility]  Discharge Assessment    Primary Care Provider: Clint Lindsey MD     Discharge Assessment (most recent)       BRIEF DISCHARGE ASSESSMENT - 02/16/23 0915          Discharge Planning    Assessment Type Discharge Planning Brief Assessment     Resource/Environmental Concerns none     Support Systems Spouse/significant other     Equipment Currently Used at Home bedside commode;shower chair;walker, rolling     Current Living Arrangements home     Patient/Family Anticipates Transition to home with family                         Living Environment: Pt lives in a one story house c 1 PACO and has a tub/shower combo.  Previous level of function: I PTA  Roles and Routines: Pt works in [employer] at [employer]  Equipment Used at Home: walker, rolling, shower chair, bedside commode  Assistance upon Discharge: Pt lives c her           Kristal Domínguez RNCM  Case Management  [facility]  [phone]

## 2023-02-16 NOTE — PROGRESS NOTES
Acute Pain Service and Perioperative Surgical Home Progress Note    Interval history  Licha Gonzales is a 57 y.o., female, status post arthroplasty of the knee with no acute events overnight.  Patient did have some elevated blood pressure.  Without diagnosis of hypertension.  Will recommend following up with primary care.    Surgery:  Procedure(s) (LRB):  ARTHROPLASTY, left KNEE, TOTAL Depuy-Attandres Teague (Left)  ARTHROPLASTY, left KNEE (Left)    Post Op Day #: 1    Problem List:    Active Hospital Problems    Diagnosis  POA    *Primary osteoarthritis of left knee [M17.12]  Yes      Resolved Hospital Problems   No resolved problems to display.       Subjective:       General appearance of alert, oriented, no complaints   Pain with rest: 1    Numbers   Pain with movement: 3    Numbers   Side Effects    1. Pruritis No    2. Nausea No    3. Motor Blockade No, 0=Ability to raise lower extremities off bed    4. Sedation No, 1=awake and alert    Schedule Medications:    acetaminophen  650 mg Oral BID    apixaban  2.5 mg Oral BID    aspirin  81 mg Oral Once    celecoxib  100 mg Oral Daily    famotidine  20 mg Oral BID    methocarbamoL  750 mg Oral TID    mupirocin  1 g Nasal BID    polyethylene glycol  17 g Oral Daily    pregabalin  75 mg Oral BID    senna-docusate 8.6-50 mg  1 tablet Oral BID        Continuous Infusions:   sodium chloride 0.9% 150 mL/hr at 02/15/23 2124        PRN Medications:  bisacodyL, morphine, naloxone, ondansetron, oxyCODONE, oxyCODONE, prochlorperazine       Antibiotics:  Antibiotics (From admission, onward)      Start     Stop Route Frequency Ordered    02/15/23 2100  mupirocin 2 % ointment 1 g         02/20 2059 Nasl 2 times daily 02/15/23 1424               Objective:         Vital Signs (Most Recent):  Temp: 98.1 °F (36.7 °C) (02/16/23 0427)  Pulse: 88 (02/16/23 0427)  Resp: 16 (02/16/23 0427)  BP: (!) 118/58 (02/16/23 0427)  SpO2: 96 % (02/16/23 0427)   Vital Signs Range (Last  24H):  Temp:  [97.2 °F (36.2 °C)-98.2 °F (36.8 °C)]   Pulse:  [80-96]   Resp:  [9-23]   BP: (103-182)/()   SpO2:  [92 %-99 %]          I & O (Last 24H):  Intake/Output Summary (Last 24 hours) at 2/16/2023 0524  Last data filed at 2/16/2023 0213  Gross per 24 hour   Intake 2710 ml   Output 1900 ml   Net 810 ml       Physical Exam:    GA: Alert, comfortable, no acute distress.   Pulmonary: Clear to auscultation . Normal respiratory ratei.  Cardiac: regular rate and rhythm.          Laboratory: reviewed in chart  CBC:   Recent Labs     02/14/23  0845   WBC 10.87   RBC 4.79   HGB 13.9   HCT 42.7      MCV 89   MCH 29.0   MCHC 32.6       BMP: No results for input(s): NA, K, CO2, CL, BUN, CREATININE, GLU, MG, PHOS, CALCIUM in the last 72 hours.    No results for input(s): PT, INR, PROTIME, APTT in the last 72 hours.          Assessment:  Pain well controlled.  Good plantar and dorsiflexion of the great toe.  Good sensation.       Pain control adequate    Plan:  1) Pain: Multimodal pain regimen ordered which includes acetaminophen, celecoxib, pregabalin, and prn oxycodone.  Well controlled on current regimen.  Will continue to monitor.     2) elevated blood pressure without diagnosis of hypertension, follow-up with primary care  3) FEN/GI: Tolerating regular diet.     4) Dispo: Pt working well with PT/OT. Case management and SW following along for setting up home health and physical therapy. Plan to discharge home this am.              Evaluator Willian Mohan PA-C

## 2023-02-16 NOTE — DISCHARGE SUMMARY
[facility])  Orthopedics  Discharge Summary      Patient Name: Licha Gonzales  MRN: [id number]  Admission Date: 2/15/2023  Hospital Length of Stay: 1 days  Discharge Date and Time: No discharge date for patient encounter.  Attending Physician: Semaj Teague III, MD   Discharging Provider: Jim Stanley MD  Primary Care Provider: Clint Lindsey MD    HPI:   CC:  Left knee pain     Licha Gonzales is a 57 y.o. female with history of Left knee pain. Pain is worse with activity and weight bearing.  Patient has experienced interference of activities of daily living due to decreased range of motion and an increase in joint pain and swelling.  Patient has failed non-operative treatment including NSAIDs, corticosteroid injections, viscosupplement injections, and activity modification.  Licha Gonzales currently ambulates independently.      Relevant medical conditions of significance in perioperative period:  History or right knee replacement  GERD- on prilosec         Procedure(s) (LRB):  ARTHROPLASTY, left KNEE, [facility] (Left)  ARTHROPLASTY, left KNEE (Left)      Hospital Course:  On 2/15/2023, the patient arrived to the [place] or proper pre-operative management.  Upon completion of pre-operative preparation, the patient was taken back to the operative theatre. L TKA was performed without complication and the patient was transported to the post anesthesia care unit in stable condition.  After appropriate recovery from the anaesthetic agents used during the surgery, the patient was then transported to the hospital inpatient floor.  The interim of the hospital stay from arrival on the floor up to discharge has been uncomplicated. The patient has tolerated regular diet.  The patient's pain has been controlled using a multimodal approach. Currently, the patient's pain is well controlled on an oral regimen.  The patient has been voiding without difficulty.  The patient began  participation in physical therapy after surgery and has progressed throughout the entire hospital stay.  Currently, the patient's progress is sufficient to allow the them to be discharged to home safely.  The patient agrees with this assessment and desires a discharge today.        Goals of Care Treatment Preferences:         Consults (From admission, onward)        Status Ordering Provider     Inpatient consult to Social Work  Once        Provider:  (Not yet assigned)    Acknowledged MARYSE BROWN     Inpatient consult to Pain Management  Once        Provider:  (Not yet assigned)    Acknowledged MARYSE BROWN     Inpatient consult to Respiratory Care  Once        Provider:  (Not yet assigned)    Acknowledged MARYSE BROWN          Significant Diagnostic Studies: No pertinent studies.    Pending Diagnostic Studies:     None        Final Active Diagnoses:    Diagnosis Date Noted POA    PRINCIPAL PROBLEM:  Primary osteoarthritis of left knee [M17.12] 02/15/2023 Yes      Problems Resolved During this Admission:      Discharged Condition: good    Disposition: Home or Self Care    Follow Up:    Patient Instructions:      Activity as tolerated     Sponge bath only until clinic visit     Keep surgical extremity elevated     Lifting restrictions   Order Comments: No strenuous exercise or lifting of > 10 lbs     Weight bearing as tolerated     No driving, operating heavy equipment or signing legal documents while taking pain medication     Leave dressing on - Keep it clean, dry, and intact until clinic visit   Order Comments: Do not remove surgical dressing for 2 weeks post-op. This will be done only by MD at initial post-op visit. If dressing is completely saturated, replace with identical dressing - silver-impregnated hydrocolloid dressing.     Do not get dressings wet. Do not shower.     If dressing continues to be saturated or there are signs of infection, please call Ortho Clinic [phone] for further  instructions and to make appt to be seen.     Call MD for:  temperature >100.4     Call MD for:  persistent nausea and vomiting     Call MD for:  severe uncontrolled pain     Call MD for:  difficulty breathing, headache or visual disturbances     Call MD for:  redness, tenderness, or signs of infection (pain, swelling, redness, odor or green/yellow discharge around incision site)     Call MD for:  hives     Call MD for:  persistent dizziness or light-headedness     Call MD for:  extreme fatigue     Medications:  Reconciled Home Medications:      Medication List      START taking these medications    apixaban 2.5 mg Tab  Commonly known as: ELIQUIS  Take 1 tablet (2.5 mg total) by mouth 2 (two) times daily.     celecoxib 100 MG capsule  Commonly known as: CeleBREX  Take 1 capsule (100 mg total) by mouth once daily.     pregabalin 75 MG capsule  Commonly known as: LYRICA  Take 1 capsule (75 mg total) by mouth 2 (two) times daily.        CHANGE how you take these medications    acetaminophen 650 MG Tbsr  Commonly known as: TYLENOL  Take 1 tablet (650 mg total) by mouth every 12 (twelve) hours.  What changed: when to take this        CONTINUE taking these medications    albuterol 90 mcg/actuation inhaler  Commonly known as: PROVENTIL/VENTOLIN HFA  INHALE 1 TO 2 PUFFS BY MOUTH EVERY 4 TO 6 HOURS     biotin 10,000 mcg Cap  Take by mouth once daily.     cyclobenzaprine 10 MG tablet  Commonly known as: FLEXERIL  Take 10 mg by mouth every evening.     docusate sodium 100 MG capsule  Commonly known as: COLACE  Take 1 capsule (100 mg total) by mouth 2 (two) times daily.     * ergocalciferol 50,000 unit Cap  Commonly known as: ERGOCALCIFEROL  Take 50,000 Units by mouth every 7 days. Tuesday     * ergocalciferol 50,000 unit Cap  Commonly known as: ERGOCALCIFEROL  Take 1 capsule (50,000 Units total) by mouth every 7 days. For 8 weeks     estradioL 0.5 MG tablet  Commonly known as: ESTRACE  Take 1 tablet (0.5 mg total) by mouth  once daily.  Notes to patient: Hold this for 2-4 weeks post op      fluticasone propionate 50 mcg/actuation nasal spray  Commonly known as: FLONASE  2 sprays by Each Nostril route once daily. Shake Liquid and use  As needed     methocarbamoL 750 MG Tab  Commonly known as: ROBAXIN  Take 1 tablet (750 mg total) by mouth 4 (four) times daily as needed (muscle spasms).     montelukast 10 mg tablet  Commonly known as: SINGULAIR  Take 10 mg by mouth every evening.     omeprazole 40 MG capsule  Commonly known as: PRILOSEC  Take 40 mg by mouth once daily.     oxyCODONE 5 MG immediate release tablet  Commonly known as: ROXICODONE  Take 1-2 tablets by mouth every 4-6 hours as needed for pain     sucralfate 1 gram tablet  Commonly known as: CARAFATE  Take by mouth daily as needed. As needed     SUDAFED ORAL  Take by mouth as needed. Allergies     traZODone 100 MG tablet  Commonly known as: DESYREL  Take 100 mg by mouth nightly as needed for Insomnia.         * This list has 2 medication(s) that are the same as other medications prescribed for you. Read the directions carefully, and ask your doctor or other care provider to review them with you.                Jim Stanley MD  Orthopedics  [facility])

## 2023-02-16 NOTE — PLAN OF CARE
"Patient tolerated PT session well. Patient ambulated 230ft with RW and supervision. No LOB or SOB noted. Patient ascended/descended 6" curb step with RW and contact guard assistance. Patient performed L LE therex x10 reps. Patient has an OPPT appointment on 2/17/2023. Patient ready to discharge home from PT standpoint.       Problem: Physical Therapy  Goal: Physical Therapy Goal  Outcome: Met     "

## 2023-02-16 NOTE — PLAN OF CARE
Problem: Adult Inpatient Plan of Care  Goal: Patient-Specific Goal (Individualized)  Outcome: Ongoing, Progressing  Flowsheets (Taken 2/15/2023 1957)  Anxieties, Fears or Concerns: surgery anxiety  Individualized Care Needs: post op pain control     Problem: Pain Acute  Goal: Acceptable Pain Control and Functional Ability  Intervention: Develop Pain Management Plan  Flowsheets (Taken 2/15/2023 1957)  Pain Management Interventions:   care clustered   cold applied   pain management plan reviewed with patient/caregiver     Problem: Pain Acute  Goal: Acceptable Pain Control and Functional Ability  Intervention: Prevent or Manage Pain  Flowsheets (Taken 2/15/2023 1957)  Sensory Stimulation Regulation:   care clustered   lighting decreased  Medication Review/Management: medications reviewed     Problem: Pain Acute  Goal: Acceptable Pain Control and Functional Ability  Intervention: Optimize Psychosocial Wellbeing  Flowsheets (Taken 2/15/2023 1957)  Supportive Measures:   active listening utilized   positive reinforcement provided     Problem: Adjustment to Surgery (Knee Arthroplasty)  Goal: Optimal Coping  Intervention: Support Psychosocial Response to Surgery and Mobility Changes  Flowsheets (Taken 2/15/2023 1957)  Supportive Measures:   active listening utilized   positive reinforcement provided     Problem: Bleeding (Knee Arthroplasty)  Goal: Absence of Bleeding  Intervention: Monitor and Manage Bleeding  Flowsheets (Taken 2/15/2023 1957)  Bleeding Management: dressing monitored     Problem: Pain (Knee Arthroplasty)  Goal: Acceptable Pain Control  Intervention: Prevent or Manage Pain  Flowsheets (Taken 2/15/2023 1957)  Pain Management Interventions:   care clustered   cold applied   pain management plan reviewed with patient/caregiver     Problem: Fall Injury Risk  Goal: Absence of Fall and Fall-Related Injury  Intervention: Identify and Manage Contributors  Flowsheets (Taken 2/15/2023 1957)  Medication  Review/Management: medications reviewed     Problem: Fall Injury Risk  Goal: Absence of Fall and Fall-Related Injury  Intervention: Promote Injury-Free Environment  Flowsheets (Taken 2/15/2023 1957)  Safety Promotion/Fall Prevention:   assistive device/personal item within reach   instructed to call staff for mobility   in recliner, wheels locked   lighting adjusted   medications reviewed

## 2023-02-16 NOTE — SUBJECTIVE & OBJECTIVE
"Principal Problem:Primary osteoarthritis of left knee    Principal Orthopedic Problem: Same    Interval History: CHAVO, patient stable, pain controlled. No acute complaints this morning. Not seen by PT because she was eating and working with OT during attempts. Dressings CDI.     Review of patient's allergies indicates:   Allergen Reactions    Biaxin [clarithromycin] Rash       Current Facility-Administered Medications   Medication    0.9%  NaCl infusion    acetaminophen tablet 650 mg    apixaban tablet 2.5 mg    aspirin chewable tablet 81 mg    bisacodyL suppository 10 mg    celecoxib capsule 100 mg    famotidine tablet 20 mg    methocarbamoL tablet 750 mg    morphine injection 2 mg    mupirocin 2 % ointment 1 g    naloxone 0.4 mg/mL injection 0.02 mg    ondansetron injection 4 mg    oxyCODONE immediate release tablet 5 mg    oxyCODONE immediate release tablet Tab 10 mg    polyethylene glycol packet 17 g    pregabalin capsule 75 mg    prochlorperazine injection Soln 5 mg    senna-docusate 8.6-50 mg per tablet 1 tablet     Objective:     Vital Signs (Most Recent):  Temp: 98.1 °F (36.7 °C) (02/16/23 0427)  Pulse: 88 (02/16/23 0427)  Resp: 16 (02/16/23 0542)  BP: (!) 118/58 (02/16/23 0427)  SpO2: 96 % (02/16/23 0427)   Vital Signs (24h Range):  Temp:  [97.2 °F (36.2 °C)-98.2 °F (36.8 °C)] 98.1 °F (36.7 °C)  Pulse:  [80-96] 88  Resp:  [9-23] 16  SpO2:  [92 %-99 %] 96 %  BP: (103-182)/() 118/58     Weight: 97.5 kg (215 lb)  Height: 5' 5" (165.1 cm)  Body mass index is 35.78 kg/m².      Intake/Output Summary (Last 24 hours) at 2/16/2023 0605  Last data filed at 2/16/2023 0213  Gross per 24 hour   Intake 2710 ml   Output 1900 ml   Net 810 ml       Ortho/SPM Exam  AAOx4  NAD  Reg rate  No increased WOB    LLE:  Dressing c/d/i  Ice in place  SILT T/SP/DP/Myers/Sa  Motor intact T/SP/DP  WWP extremities  FCDs in place and functioning    Significant Labs:   Recent Lab Results       None          All pertinent labs within " the past 24 hours have been reviewed.    Significant Imaging: I have reviewed and interpreted all pertinent imaging results/findings.

## 2023-02-16 NOTE — PROGRESS NOTES
Went to see pt at [facility] [address]. Awake and alert, sitting up in bed and talking on the phone.  at bedside with her. Dressing to left knee CDI and wrapped with ice. Pt rates pain 7 out of 10 right now, but said she's due to take pain meds in about 15 minutes. Answered her questions about PT appts, etc. Pt verbalized understanding.

## 2023-02-16 NOTE — PLAN OF CARE
Problem: Adult Inpatient Plan of Care  Goal: Plan of Care Review  Outcome: Ongoing, Progressing  Flowsheets (Taken 2/16/2023 0549)  Plan of Care Reviewed With: patient  Goal: Patient-Specific Goal (Individualized)  Outcome: Ongoing, Progressing  Flowsheets (Taken 2/16/2023 0549)  Anxieties, Fears or Concerns: Generalized  Individualized Care Needs: Pain management     Problem: Pain Acute  Goal: Acceptable Pain Control and Functional Ability  Outcome: Ongoing, Progressing  Intervention: Develop Pain Management Plan  Flowsheets (Taken 2/16/2023 0549)  Pain Management Interventions:   care clustered   cold applied   medication offered but refused   pain management plan reviewed with patient/caregiver   position adjusted   quiet environment facilitated   relaxation techniques promoted  Intervention: Prevent or Manage Pain  Flowsheets (Taken 2/16/2023 0549)  Sleep/Rest Enhancement:   awakenings minimized   relaxation techniques promoted   room darkened  Sensory Stimulation Regulation:   auditory stimulation minimized   quiet environment promoted   visual stimulation minimized   care clustered   lighting decreased  Bowel Elimination Promotion: adequate fluid intake promoted  Medication Review/Management:   medications reviewed   high-risk medications identified     Problem: Bleeding (Knee Arthroplasty)  Goal: Absence of Bleeding  Outcome: Ongoing, Progressing  Intervention: Monitor and Manage Bleeding  Flowsheets (Taken 2/16/2023 0549)  Bleeding Management: dressing monitored     Problem: Fall Injury Risk  Goal: Absence of Fall and Fall-Related Injury  Outcome: Ongoing, Progressing  Intervention: Identify and Manage Contributors  Flowsheets (Taken 2/16/2023 0549)  Self-Care Promotion:   BADL personal objects within reach   safe use of adaptive equipment encouraged  Medication Review/Management:   medications reviewed   high-risk medications identified

## 2023-02-17 ENCOUNTER — CLINICAL SUPPORT (OUTPATIENT)
Dept: REHABILITATION | Facility: HOSPITAL | Age: 58
End: 2023-02-17
Attending: ORTHOPAEDIC SURGERY
Payer: COMMERCIAL

## 2023-02-17 ENCOUNTER — TELEPHONE (OUTPATIENT)
Dept: ORTHOPEDICS | Facility: CLINIC | Age: 58
End: 2023-02-17
Payer: COMMERCIAL

## 2023-02-17 DIAGNOSIS — M62.81 MUSCLE WEAKNESS OF LOWER EXTREMITY: ICD-10-CM

## 2023-02-17 DIAGNOSIS — R26.9 GAIT ABNORMALITY: ICD-10-CM

## 2023-02-17 DIAGNOSIS — M17.12 PRIMARY OSTEOARTHRITIS OF LEFT KNEE: ICD-10-CM

## 2023-02-17 DIAGNOSIS — R60.9 EDEMA, UNSPECIFIED TYPE: Primary | ICD-10-CM

## 2023-02-17 DIAGNOSIS — M25.60 DECREASED MOBILITY OF JOINT: ICD-10-CM

## 2023-02-17 PROCEDURE — 97110 THERAPEUTIC EXERCISES: CPT | Mod: PO

## 2023-02-17 PROCEDURE — 97116 GAIT TRAINING THERAPY: CPT | Mod: PO

## 2023-02-17 PROCEDURE — 97161 PT EVAL LOW COMPLEX 20 MIN: CPT | Mod: PO

## 2023-02-17 PROCEDURE — 97140 MANUAL THERAPY 1/> REGIONS: CPT | Mod: PO

## 2023-02-17 NOTE — ANESTHESIA POSTPROCEDURE EVALUATION
Anesthesia Post Evaluation    Patient: Licha Gonzales    Procedure(s) Performed: Procedure(s) (LRB):  ARTHROPLASTY, left KNEE, TOTAL Depuy-Attune NI Teague (Left)  ARTHROPLASTY, left KNEE (Left)    Final Anesthesia Type: spinal      Patient location during evaluation: PACU  Patient participation: Yes- Able to Participate  Level of consciousness: awake and alert and oriented  Post-procedure vital signs: reviewed and stable  Pain management: adequate  Airway patency: patent    PONV status at discharge: No PONV  Anesthetic complications: no      Cardiovascular status: hemodynamically stable  Respiratory status: nasal cannula  Hydration status: euvolemic  Follow-up not needed.          Vitals Value Taken Time   /66 02/16/23 1148   Temp 36.4 °C (97.6 °F) 02/16/23 1148   Pulse 81 02/16/23 1148   Resp 16 02/16/23 1154   SpO2 97 % 02/16/23 1148         Event Time   Out of Recovery 16:47:35         Pain/Naina Score: Pain Rating Prior to Med Admin: 10 (2/16/2023 11:54 AM)  Pain Rating Post Med Admin: 4 (2/16/2023  9:43 AM)

## 2023-02-17 NOTE — PROGRESS NOTES
[facility]   Physical Therapy Initial Evaluation       Name: Licha Gonzales  Clinic Number: [id number]    Therapy Diagnosis:   Encounter Diagnoses   Name Primary?    Primary osteoarthritis of left knee     Edema, unspecified type Yes    Decreased mobility of joint     Gait abnormality     Muscle weakness of lower extremity         Physician: Clint Lindsey,*    Physician Orders: PT Eval and Treat   Medical Diagnosis from Referral: M17.12 (ICD-10-CM) - Primary osteoarthritis of left knee  Evaluation Date: 2/17/2023  Authorization Period Expiration: 12/31/2023  Plan of Care Expiration: 3/1/2023  Progress Note Due: 2/20/2023  Visit # / Visits authorized: 1/ 1   FOTO: 1/3  Precautions: Standard   Time In: 9:00 am  Time Out: 10:00 am  Total Appointment Time (timed & untimed codes): 60 minutes  SUBJECTIVE   Date of onset: 2/15/2023  History of current condition - Licha reports: undergoing a left-sided total knee replacement on 2/15/2023. She was discharged yesterday, and denies any complications since surgery except a great deal of pain. She denies headaches or fever.  Falls: None since surgery    Imaging, Radiographs: There are immediate postoperative changes of total left knee arthroplasty.  The femoral and tibial components are well seated.  No periprosthetic lucency is identified.  The joint spaces are maintained.  The space and postoperative air and edema within the anterior knee compartment.  No suspicious radiopaque foreign body is identified.  There is no evidence of a fracture or dislocation.       Prior Therapy: N/A  Social History: Lives with their spouse, Ceasar, in a one-level first floor apartment outside of [place], [place]. She owns a dog and lives in a house with one step to enter, and a shower-tub combination which has grab-bars and a shower chair from her previous knee surgery, which she had a right-sided TKA 10 years ago.  Occupation: Deli at [employer]  Prior Level of  Function: Independent  Current Level of Function: Impairments consistent with post-operative status    Pain:  Current 8/10, worst 10/10, best 7/10   Location: left knee    Description: Sharp and Sore  Aggravating Factors: Sitting, Standing, Bending, Walking, Night Time, Morning, Extension, Flexing, and Getting out of bed/chair  Easing Factors: pain medication, ice, and elevation    Patients goals: To return to home in [place] with Home exercise program and strategies to manage until beginning PT there.     Medical History:   Past Medical History:   Diagnosis Date    Arthritis     GERD (gastroesophageal reflux disease)     IBS (irritable bowel syndrome)        Surgical History:   Licha Gonzales  has a past surgical history that includes Hysterectomy; Nose surgery; left shoulder (2016); Hand surgery (Bilateral); Total knee arthroplasty (Right, 2012); Joint replacement; Total knee arthroplasty (Left, 2/15/2023); and Knee Arthroplasty (Left, 2/15/2023).    Medications:   Licha has a current medication list which includes the following prescription(s): acetaminophen, albuterol, apixaban, biotin, celecoxib, cyclobenzaprine, docusate sodium, ergocalciferol, ergocalciferol, estradiol, fluticasone propionate, methocarbamol, montelukast, omeprazole, oxycodone, pregabalin, pseudoephedrine hcl, sucralfate, and trazodone.    Allergies:   Review of patient's allergies indicates:   Allergen Reactions    Biaxin [clarithromycin] Rash      OBJECTIVE   Vitals  Heart Rate: 87 bpm  Abnormalities:-  Blood Pressure: 152/90 mmHg  Ambulation: Licha ambulates with a rolling walker and fair heel to toe gait pattern, and clearance of the operative foot past the currently non-affected foot with decreased gait speed and neeru.  Sit to Stand: Licha demonstrates good hand-placement with one hand on walker and one hand on stable seating surface in an effort to use bilateral upper extremity assistance to offload left leg. She also  "exhibits decreased knee flexion during sit to stand transfer as well as swing phase of gait.    Integumentary: Bandage is clean and intact and without any evidence of bleeding, wound dehiscence or infection.    Cutaneous: Decreased sensation of the infrapatellar branch of the saphenous nerve.    Circumference:   Left   Knee Joint Line 47.8 cm   10 cm below 36.5 cm     Knee Active Range of Motion   Left   Flexion 70 degrees   Extension -10 degrees     Knee Passive Range of Motion   Left End-Feel   Flexion 75 degrees Firm   Extension -8 degrees Boggy, guarded     Joint Mobility  Tibiofemoral A/P [place] Not assessed this visit   Tibiofemoral P/A [place] Not assessed this visit   Patellofemoral Joint Mobility Within normal limits     Manual Muscle Testing   Right Left   Knee Flexion (uniplanar) - -   Knee Extension  - -   Ankle Dorsiflexion 5/5 5/5     Limitation/Restriction for FOTO Knee Survey  Therapist reviewed FOTO scores for Licha Gonzales on 2/17/2023.   FOTO documents entered into [employer] - see Media section.  Limitation Score: To be continued%       TREATMENT     Total Treatment time (time-based codes) separate from Evaluation: 40 minutes      Licha received the treatments listed below:    therapeutic exercises to develop strength, endurance, ROM, flexibility, and posture for 20 minutes including:  Quad Sets, 20x5" hold  Short Arc Quad, 2x10  Knee Prop with Extension, 3 minutes  Heel Slides with Strap, 3 minutes  Heel Slides, sitting, 10x5" hold    manual therapy techniques: Joint mobilizations and Manual Lymphatic Drainage were applied  for 15 minutes, including:  Patellofemoral Joint Mobilizations  Retrograde Edema Massage (R)  PROM Knee Flexion and Extension    gait training to improve functional mobility and safety for 5  minutes, including:  Ambulation with Rolling Walker, cueing for heel to toe gait pattern, 5 minutes    PATIENT EDUCATION AND HOME EXERCISES     Education provided:   - -Findings of evaluation " and examination, and affect of these on plan for treatment  -Prognosis and expectations  -Role of PT and team-centered care for patient  -Home exercise program and expectations of therapy  -Teams [facility] and PT/PTA treatment    Written Home Exercises Provided: yes. Exercises were reviewed and Licha was able to demonstrate them prior to the end of the session.  Licha demonstrated good  understanding of the education provided. See EMR under Patient Instructions for exercises provided during therapy sessions.    ASSESSMENT     Licha is a 57 y.o. female referred to outpatient Physical Therapy with a medical diagnosis of M17.12 (ICD-10-CM) - Primary osteoarthritis of left knee. Patient presents with impairments that are consistent with post-operative status, including localized post-operative edema that due to compression stocking is well-managed in the distal lower extremity. Despite impairments in quadriceps strength, active and passive range of motion, ambulation impairments and functional transfer being present, she is progressing well with 75 degrees of flexion, fair quadriceps contraction, and minimal cueing for nearly normal gait pattern. Her knee extension range of motion seems in need of most improvement at this time at -10 degrees of full extension, and this will be emphasized next visit prior to discharge.    Patient prognosis is Excellent.   Patient will benefit from skilled outpatient Physical Therapy to address the deficits stated above and in the chart below, provide patient /family education, and to maximize patientt's level of independence.     Plan of care discussed with patient: Yes  Patient's spiritual, cultural and educational needs considered and patient is agreeable to the plan of care and goals as stated below:     Anticipated Barriers for therapy: None    Medical Necessity is demonstrated by the following  History  Co-morbidities and personal factors that may impact the  plan of care Co-morbidities:   Age, BMI    Personal Factors:   age     low   Examination  Body Structures and Functions, activity limitations and participation restrictions that may impact the plan of care Body Regions:   lower extremities    Body Systems:    gross symmetry  ROM  strength  gross coordinated movement  balance  gait  transfers  transitions  motor control  motor learning  blood pressure  edema    Participation Restrictions:   Independent Community Dweller    Activity limitations:   Learning and applying knowledge  no deficits    General Tasks and Commands  no deficits    Communication  no deficits    Mobility  fine hand use (grasping/picking up)  walking  moving around using equipment (WC)  using transportation (bus, train, plane, car)  driving (bike, car, motorcycle)    Self care  washing oneself (bathing, drying, washing hands)  toileting  dressing    Domestic Life  shopping  cooking  doing house work (cleaning house, washing dishes, laundry)  assisting others    Interactions/Relationships  no deficits    Life Areas  employment    Community and Social Life  community life  recreation and leisure         low   Clinical Presentation stable and uncomplicated low   Decision Making/ Complexity Score: low     Goals:  Goals:  Short Term Goals: 2 weeks   1.) Patient will demonstrate independence in compliance and technique of home exercise program provided as per teach-back method of assessment.  2.) Patient will achieve 0 degrees of active and passive knee extension so as to improve normal terminal knee extension during ambulation.  3.) Patient will achieve 90 degrees of knee flexion to demonstrate improvement in functional knee range of motion.  4.) Patient will complete 10 straight-leg raises without knee extension lag to demonstrate improved quadriceps function and endurance.  5.) Patient will demonstrate a 10% improvement as per the FOTO.    Long Term Goals: 6 weeks   1.) Patient will demonstrate  independence in compliance and technique of home exercise program provided as per teach-back method of assessment.  2.) Patient will achieve and maintain 0 degrees of active and passive knee extension so as to improve normal terminal knee extension during ambulation.  3.) Patient will achieve 120 degrees of knee flexion to demonstrate improvement in functional knee range of motion.  4.) Patient will ambulate for 300 feet with LRAD and with normal gait mechanics to demonstrate improved functional mobility.  5.) Patient will demonstrate a 20% improvement as per the FOTO.  PLAN   Plan of care Certification: 2/17/2023 to 3/1/2023.    Outpatient Physical Therapy 3 times weekly for 1 weeks to include the following interventions: Gait Training, Manual Therapy, Neuromuscular Re-ed, Patient Education, Self Care, Therapeutic Activities, and Therapeutic Exercise.     Rosa Hart PT, DPT  Board Certified in Orthopedic Physical Therapy        I CERTIFY THE NEED FOR THESE SERVICES FURNISHED UNDER THIS PLAN OF TREATMENT AND WHILE UNDER MY CARE   Physician's comments:     Physician's Signature: ___________________________________________________

## 2023-02-17 NOTE — TELEPHONE ENCOUNTER
Visited with Licha and her  in  [place].  Licha had PT earlier - it went well. However, she is in a good bit of pain now.   Pt took a second oxy and I gave her a different ice pack.  Prior to my departure from room, it was starting to work. Advised she can take a 2nd oxy when she's in severe pain.  Otherwise, dressing CDI, minimal swelling and bruising, wearing GUSTAVO, using ice, not able to elevate to extent preferred due to pain at this time.  They will request recliner from  to see if this would help as well. We discussed movement and equipment use. She has been doing HEP and will continue to do so over the weekend.   Has blue armband to call for concerns.

## 2023-02-17 NOTE — PLAN OF CARE
[facility]   Physical Therapy Initial Evaluation       Name: Licha Gonzales  Clinic Number: [id number]    Therapy Diagnosis:   Encounter Diagnoses   Name Primary?    Primary osteoarthritis of left knee     Edema, unspecified type Yes    Decreased mobility of joint     Gait abnormality     Muscle weakness of lower extremity         Physician: Clint Lindsey,*    Physician Orders: PT Eval and Treat   Medical Diagnosis from Referral: M17.12 (ICD-10-CM) - Primary osteoarthritis of left knee  Evaluation Date: 2/17/2023  Authorization Period Expiration: 12/31/2023  Plan of Care Expiration: 3/1/2023  Progress Note Due: 2/20/2023  Visit # / Visits authorized: 1/ 1   FOTO: 1/3  Precautions: Standard   Time In: 9:00 am  Time Out: 10:00 am  Total Appointment Time (timed & untimed codes): 60 minutes  SUBJECTIVE   Date of onset: 2/15/2023  History of current condition - Licha reports: undergoing a left-sided total knee replacement on 2/15/2023. She was discharged yesterday, and denies any complications since surgery except a great deal of pain. She denies headaches or fever.  Falls: None since surgery    Imaging, Radiographs: There are immediate postoperative changes of total left knee arthroplasty.  The femoral and tibial components are well seated.  No periprosthetic lucency is identified.  The joint spaces are maintained.  The space and postoperative air and edema within the anterior knee compartment.  No suspicious radiopaque foreign body is identified.  There is no evidence of a fracture or dislocation.       Prior Therapy: N/A  Social History: Lives with their spouse, Ceasar, in a one-level first floor apartment outside of [place], [place]. She owns a dog and lives in a house with one step to enter, and a shower-tub combination which has grab-bars and a shower chair from her previous knee surgery, which she had a right-sided TKA 10 years ago.  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     Medical History:   Past Medical History:   Diagnosis Date    Arthritis     GERD (gastroesophageal reflux disease)     IBS (irritable bowel syndrome)        Surgical History:   iLcha Gonzales  has a past surgical history that includes Hysterectomy; Nose surgery; left shoulder (2016); Hand surgery (Bilateral); Total knee arthroplasty (Right, 2012); Joint replacement; Total knee arthroplasty (Left, 2/15/2023); and Knee Arthroplasty (Left, 2/15/2023).    Medications:   Licha has a current medication list which includes the following prescription(s): acetaminophen, albuterol, apixaban, biotin, celecoxib, cyclobenzaprine, docusate sodium, ergocalciferol, ergocalciferol, estradiol, fluticasone propionate, methocarbamol, montelukast, omeprazole, oxycodone, pregabalin, pseudoephedrine hcl, sucralfate, and trazodone.    Allergies:   Review of patient's allergies indicates:   Allergen Reactions    Biaxin [clarithromycin] Rash      OBJECTIVE   Vitals  Heart Rate: 87 bpm  Abnormalities:-  Blood Pressure: 152/90 mmHg  Ambulation: Licha ambulates with a rolling walker and fair heel to toe gait pattern, and clearance of the operative foot past the currently non-affected foot with decreased gait speed and neeru.  Sit to Stand: Licha demonstrates good hand-placement with one hand on walker and one hand on stable seating surface in an effort to use bilateral upper extremity assistance to offload left leg. She also  "exhibits decreased knee flexion during sit to stand transfer as well as swing phase of gait.    Integumentary: Bandage is clean and intact and without any evidence of bleeding, wound dehiscence or infection.    Cutaneous: Decreased sensation of the infrapatellar branch of the saphenous nerve.    Circumference:   Left   Knee Joint Line 47.8 cm   10 cm below 36.5 cm     Knee Active Range of Motion   Left   Flexion 70 degrees   Extension -10 degrees     Knee Passive Range of Motion   Left End-Feel   Flexion 75 degrees Firm   Extension -8 degrees Boggy, guarded     Joint Mobility  Tibiofemoral A/P [place] Not assessed this visit   Tibiofemoral P/A [place] Not assessed this visit   Patellofemoral Joint Mobility Within normal limits     Manual Muscle Testing   Right Left   Knee Flexion (uniplanar) - -   Knee Extension  - -   Ankle Dorsiflexion 5/5 5/5     Limitation/Restriction for FOTO Knee Survey  Therapist reviewed FOTO scores for Licha Gonzales on 2/17/2023.   FOTO documents entered into [employer] - see Media section.  Limitation Score: To be continued%       TREATMENT     Total Treatment time (time-based codes) separate from Evaluation: 40 minutes      Licha received the treatments listed below:    therapeutic exercises to develop strength, endurance, ROM, flexibility, and posture for 20 minutes including:  Quad Sets, 20x5" hold  Short Arc Quad, 2x10  Knee Prop with Extension, 3 minutes  Heel Slides with Strap, 3 minutes  Heel Slides, sitting, 10x5" hold    manual therapy techniques: Joint mobilizations and Manual Lymphatic Drainage were applied  for 15 minutes, including:  Patellofemoral Joint Mobilizations  Retrograde Edema Massage (R)  PROM Knee Flexion and Extension    gait training to improve functional mobility and safety for 5  minutes, including:  Ambulation with Rolling Walker, cueing for heel to toe gait pattern, 5 minutes    PATIENT EDUCATION AND HOME EXERCISES     Education provided:   - -Findings of evaluation " and examination, and affect of these on plan for treatment  -Prognosis and expectations  -Role of PT and team-centered care for patient  -Home exercise program and expectations of therapy  -Teams [facility] and PT/PTA treatment    Written Home Exercises Provided: yes. Exercises were reviewed and Licha was able to demonstrate them prior to the end of the session.  Licha demonstrated good  understanding of the education provided. See EMR under Patient Instructions for exercises provided during therapy sessions.    ASSESSMENT     Licha is a 57 y.o. female referred to outpatient Physical Therapy with a medical diagnosis of M17.12 (ICD-10-CM) - Primary osteoarthritis of left knee. Patient presents with impairments that are consistent with post-operative status, including localized post-operative edema that due to compression stocking is well-managed in the distal lower extremity. Despite impairments in quadriceps strength, active and passive range of motion, ambulation impairments and functional transfer being present, she is progressing well with 75 degrees of flexion, fair quadriceps contraction, and minimal cueing for nearly normal gait pattern. Her knee extension range of motion seems in need of most improvement at this time at -10 degrees of full extension, and this will be emphasized next visit prior to discharge.    Patient prognosis is Excellent.   Patient will benefit from skilled outpatient Physical Therapy to address the deficits stated above and in the chart below, provide patient /family education, and to maximize patientt's level of independence.     Plan of care discussed with patient: Yes  Patient's spiritual, cultural and educational needs considered and patient is agreeable to the plan of care and goals as stated below:     Anticipated Barriers for therapy: None    Medical Necessity is demonstrated by the following  History  Co-morbidities and personal factors that may impact the  plan of care Co-morbidities:   Age, BMI    Personal Factors:   age     low   Examination  Body Structures and Functions, activity limitations and participation restrictions that may impact the plan of care Body Regions:   lower extremities    Body Systems:    gross symmetry  ROM  strength  gross coordinated movement  balance  gait  transfers  transitions  motor control  motor learning  blood pressure  edema    Participation Restrictions:   Independent Community Dweller    Activity limitations:   Learning and applying knowledge  no deficits    General Tasks and Commands  no deficits    Communication  no deficits    Mobility  fine hand use (grasping/picking up)  walking  moving around using equipment (WC)  using transportation (bus, train, plane, car)  driving (bike, car, motorcycle)    Self care  washing oneself (bathing, drying, washing hands)  toileting  dressing    Domestic Life  shopping  cooking  doing house work (cleaning house, washing dishes, laundry)  assisting others    Interactions/Relationships  no deficits    Life Areas  employment    Community and Social Life  community life  recreation and leisure         low   Clinical Presentation stable and uncomplicated low   Decision Making/ Complexity Score: low     Goals:  Goals:  Short Term Goals: 2 weeks   1.) Patient will demonstrate independence in compliance and technique of home exercise program provided as per teach-back method of assessment.  2.) Patient will achieve 0 degrees of active and passive knee extension so as to improve normal terminal knee extension during ambulation.  3.) Patient will achieve 90 degrees of knee flexion to demonstrate improvement in functional knee range of motion.  4.) Patient will complete 10 straight-leg raises without knee extension lag to demonstrate improved quadriceps function and endurance.  5.) Patient will demonstrate a 10% improvement as per the FOTO.    Long Term Goals: 6 weeks   1.) Patient will demonstrate  independence in compliance and technique of home exercise program provided as per teach-back method of assessment.  2.) Patient will achieve and maintain 0 degrees of active and passive knee extension so as to improve normal terminal knee extension during ambulation.  3.) Patient will achieve 120 degrees of knee flexion to demonstrate improvement in functional knee range of motion.  4.) Patient will ambulate for 300 feet with LRAD and with normal gait mechanics to demonstrate improved functional mobility.  5.) Patient will demonstrate a 20% improvement as per the FOTO.  PLAN   Plan of care Certification: 2/17/2023 to 3/1/2023.    Outpatient Physical Therapy 3 times weekly for 1 weeks to include the following interventions: Gait Training, Manual Therapy, Neuromuscular Re-ed, Patient Education, Self Care, Therapeutic Activities, and Therapeutic Exercise.     Rosa Hart PT, DPT  Board Certified in Orthopedic Physical Therapy        I CERTIFY THE NEED FOR THESE SERVICES FURNISHED UNDER THIS PLAN OF TREATMENT AND WHILE UNDER MY CARE   Physician's comments:     Physician's Signature: ___________________________________________________

## 2023-02-20 ENCOUNTER — CLINICAL SUPPORT (OUTPATIENT)
Dept: REHABILITATION | Facility: HOSPITAL | Age: 58
End: 2023-02-20
Attending: ORTHOPAEDIC SURGERY
Payer: COMMERCIAL

## 2023-02-20 ENCOUNTER — OFFICE VISIT (OUTPATIENT)
Dept: ORTHOPEDICS | Facility: CLINIC | Age: 58
End: 2023-02-20
Payer: COMMERCIAL

## 2023-02-20 ENCOUNTER — PATIENT MESSAGE (OUTPATIENT)
Dept: ADMINISTRATIVE | Facility: OTHER | Age: 58
End: 2023-02-20
Payer: COMMERCIAL

## 2023-02-20 ENCOUNTER — TELEPHONE (OUTPATIENT)
Dept: ORTHOPEDICS | Facility: CLINIC | Age: 58
End: 2023-02-20
Payer: COMMERCIAL

## 2023-02-20 VITALS — WEIGHT: 215 LBS | HEIGHT: 65 IN | BODY MASS INDEX: 35.82 KG/M2

## 2023-02-20 DIAGNOSIS — M62.81 MUSCLE WEAKNESS OF LOWER EXTREMITY: ICD-10-CM

## 2023-02-20 DIAGNOSIS — R60.9 EDEMA, UNSPECIFIED TYPE: Primary | ICD-10-CM

## 2023-02-20 DIAGNOSIS — Z96.652 STATUS POST LEFT KNEE REPLACEMENT: ICD-10-CM

## 2023-02-20 DIAGNOSIS — R26.9 GAIT ABNORMALITY: ICD-10-CM

## 2023-02-20 DIAGNOSIS — M25.60 DECREASED MOBILITY OF JOINT: ICD-10-CM

## 2023-02-20 PROCEDURE — 99024 POSTOP FOLLOW-UP VISIT: CPT | Mod: S$GLB,COE,, | Performed by: NURSE PRACTITIONER

## 2023-02-20 PROCEDURE — 99999 PR PBB SHADOW E&M-EST. PATIENT-LVL IV: CPT | Mod: PBBFAC,COE,, | Performed by: NURSE PRACTITIONER

## 2023-02-20 PROCEDURE — 97112 NEUROMUSCULAR REEDUCATION: CPT | Mod: PO

## 2023-02-20 PROCEDURE — 97140 MANUAL THERAPY 1/> REGIONS: CPT | Mod: PO

## 2023-02-20 PROCEDURE — 97110 THERAPEUTIC EXERCISES: CPT | Mod: PO

## 2023-02-20 PROCEDURE — 99024 PR POST-OP FOLLOW-UP VISIT: ICD-10-PCS | Mod: S$GLB,COE,, | Performed by: NURSE PRACTITIONER

## 2023-02-20 PROCEDURE — 99999 PR PBB SHADOW E&M-EST. PATIENT-LVL IV: ICD-10-PCS | Mod: PBBFAC,COE,, | Performed by: NURSE PRACTITIONER

## 2023-02-20 PROCEDURE — 97116 GAIT TRAINING THERAPY: CPT | Mod: PO

## 2023-02-20 RX ORDER — OXYCODONE HYDROCHLORIDE 5 MG/1
TABLET ORAL
Qty: 40 TABLET | Refills: 0 | Status: SHIPPED | OUTPATIENT
Start: 2023-02-20 | End: 2023-03-03 | Stop reason: SDUPTHER

## 2023-02-20 NOTE — TELEPHONE ENCOUNTER
Met with patient during clear to travel appointment.  Pt will call to schedule 2, 6, and 12 week post op appointments with Dr. Lindsey.  Will call if any issues arise.

## 2023-02-20 NOTE — PROGRESS NOTES
"  Physical Therapy Daily Treatment Note and Discharge Note     Name: Licha Gonzales  Clinic Number: [id number]    Therapy Diagnosis:   Encounter Diagnoses   Name Primary?    Edema, unspecified type Yes    Decreased mobility of joint     Gait abnormality     Muscle weakness of lower extremity      Physician: Clint Lindsey,*    Visit Date: 2/20/2023    Physician Orders: PT Eval and Treat   Medical Diagnosis from Referral: M17.12 (ICD-10-CM) - Primary osteoarthritis of left knee  Evaluation Date: 2/17/2023  Authorization Period Expiration: 12/31/2023  Plan of Care Expiration: 3/1/2023  Progress Note Due: 2/20/2023  Visit # / Visits authorized: 1/ 1   FOTO: 1/3  Precautions: Standard   Time In: 7:00 am  Time Out: 8:00 am  Total Billable Time: 60 minutes  Precautions: Standard  Subjective   Date of onset: 2/15/2023  History of current condition - Licha reports: undergoing a left-sided total knee replacement on 2/15/2023. She was discharged yesterday, and denies any complications since surgery except a great deal of pain. She denies headaches or fever.  Pt reports: she was in a lot of pain after the first session, and feels like she is more swollen than before.  She was somewhat compliant with home exercise program.  Response to previous treatment: Initial evaluation  Functional change: Ongoing  Pain: 7/10  Location: Lft knee    Objective   Vitals  Heart Rate: 77 bpm  Blood Pressure: 164/103 mmHg    Vitals (upon completion of session)  Heart Rate: 78 bpm  Blood Pressure: 173/ 92 mmHg      Left   Knee Flexion 60 degrees   Knee Extension -8 degrees     Quadriceps Contraction: Fair-Good (limited by pain)  Licha received therapeutic exercises to develop strength, endurance, ROM, flexibility, and posture for 30 minutes including:  Knee Prop with Extension, 3 minutes (1 minute intervals), no additional resistance  Heel Slides with Strap, 3 minutes  Heel Slides with Valslide, sitting, 20x5" hold  Objective measures " "taken.    Licha received the following manual therapy techniques: Joint mobilizations, Manual Lymphatic Drainage, and Soft tissue Mobilization were applied for 10 minutes, including:  Seated Heel Slides with PT Assistance, 5 minutes  Patellofemoral Joint Mobilizations, all planes  Short Arc Quad, 2x10, AAROM assistance    Licha participated in neuromuscular re-education activities to improve: Sense and Proprioception for 12 minutes. The following activities were included:  Quadriceps Sets, 20x5" hold, sitting and supine  Diaphragmatic Breathing    Licha participated in gait training to improve functional mobility and safety for 8  minutes, including:  Ambulation with Rolling Walker, cueing for step-though gait pattern    Home Exercises Provided and Patient Education Provided     Education provided:   - HEP    Written Home Exercises Provided: Patient instructed to cont prior HEP.  Exercises were reviewed and Licha was able to demonstrate them prior to the end of the session.  Licha demonstrated good  understanding of the education provided.     See EMR under Patient Instructions for exercises provided prior visit.    Assessment   Licha exhibits significant irritability this visit, and she benefits from a slower pace and repeated rest breaks as well as education for diaphragmatic breathing, and benefits from attention being drawn away from the pain she is feeling.. She demonstrates -8 degrees of knee extension this visit, and 60 degrees of flexion upon ending of session. Her vitals were elevated today due to pain and caution was taken to avoid performing exacerbating activities, and ending vitals were improved to 173/ 92 mmHg.    Licha Is progressing well towards her goals.   Pt prognosis is Good.     Pt will continue to benefit from skilled outpatient physical therapy to address the deficits listed in the problem list box on initial evaluation, provide pt/family education and to maximize pt's level of " independence in the home and community environment.     Pt's spiritual, cultural and educational needs considered and pt agreeable to plan of care and goals.     Anticipated barriers to physical therapy: None    Goals: Short Term Goals: 2 weeks   1.) Patient will demonstrate independence in compliance and technique of home exercise program provided as per teach-back method of assessment.  2.) Patient will achieve 0 degrees of active and passive knee extension so as to improve normal terminal knee extension during ambulation.  3.) Patient will achieve 90 degrees of knee flexion to demonstrate improvement in functional knee range of motion.  4.) Patient will complete 10 straight-leg raises without knee extension lag to demonstrate improved quadriceps function and endurance.  5.) Patient will demonstrate a 10% improvement as per the FOTO.     Long Term Goals: 6 weeks   1.) Patient will demonstrate independence in compliance and technique of home exercise program provided as per teach-back method of assessment.  2.) Patient will achieve and maintain 0 degrees of active and passive knee extension so as to improve normal terminal knee extension during ambulation.  3.) Patient will achieve 120 degrees of knee flexion to demonstrate improvement in functional knee range of motion.  4.) Patient will ambulate for 300 feet with LRAD and with normal gait mechanics to demonstrate improved functional mobility.  5.) Patient will demonstrate a 20% improvement as per the FOTO.  Plan     Discharge from [facility] and [facility] to outpatient facility near their home.    Rosa Hart PT, DPT  Board Certified in Orthopedic Physical Therapy

## 2023-02-20 NOTE — PLAN OF CARE
"  Physical Therapy Daily Treatment Note and Discharge Note     Name: Licha Gonzales  Clinic Number: [id number]    Therapy Diagnosis:   Encounter Diagnoses   Name Primary?    Edema, unspecified type Yes    Decreased mobility of joint     Gait abnormality     Muscle weakness of lower extremity      Physician: Clint Lindsey,*    Visit Date: 2/20/2023    Physician Orders: PT Eval and Treat   Medical Diagnosis from Referral: M17.12 (ICD-10-CM) - Primary osteoarthritis of left knee  Evaluation Date: 2/17/2023  Authorization Period Expiration: 12/31/2023  Plan of Care Expiration: 3/1/2023  Progress Note Due: 2/20/2023  Visit # / Visits authorized: 1/ 1   FOTO: 1/3  Precautions: Standard   Time In: 7:00 am  Time Out: 8:00 am  Total Billable Time: 60 minutes  Precautions: Standard  Subjective   Date of onset: 2/15/2023  History of current condition - Licha reports: undergoing a left-sided total knee replacement on 2/15/2023. She was discharged yesterday, and denies any complications since surgery except a great deal of pain. She denies headaches or fever.  Pt reports: she was in a lot of pain after the first session, and feels like she is more swollen than before.  She was somewhat compliant with home exercise program.  Response to previous treatment: Initial evaluation  Functional change: Ongoing  Pain: 7/10  Location: Lft knee    Objective   Vitals  Heart Rate: 77 bpm  Blood Pressure: 164/103 mmHg    Vitals (upon completion of session)  Heart Rate: 78 bpm  Blood Pressure: 173/ 92 mmHg      Left   Knee Flexion 60 degrees   Knee Extension -8 degrees     Quadriceps Contraction: Fair-Good (limited by pain)  Licha received therapeutic exercises to develop strength, endurance, ROM, flexibility, and posture for 30 minutes including:  Knee Prop with Extension, 3 minutes (1 minute intervals), no additional resistance  Heel Slides with Strap, 3 minutes  Heel Slides with Valslide, sitting, 20x5" hold  Objective measures " "taken.    Licha received the following manual therapy techniques: Joint mobilizations, Manual Lymphatic Drainage, and Soft tissue Mobilization were applied for 10 minutes, including:  Seated Heel Slides with PT Assistance, 5 minutes  Patellofemoral Joint Mobilizations, all planes  Short Arc Quad, 2x10, AAROM assistance    Licha participated in neuromuscular re-education activities to improve: Sense and Proprioception for 12 minutes. The following activities were included:  Quadriceps Sets, 20x5" hold, sitting and supine  Diaphragmatic Breathing    Licha participated in gait training to improve functional mobility and safety for 8  minutes, including:  Ambulation with Rolling Walker, cueing for step-though gait pattern    Home Exercises Provided and Patient Education Provided     Education provided:   - HEP    Written Home Exercises Provided: Patient instructed to cont prior HEP.  Exercises were reviewed and Licha was able to demonstrate them prior to the end of the session.  Licha demonstrated good  understanding of the education provided.     See EMR under Patient Instructions for exercises provided prior visit.    Assessment   Licha exhibits significant irritability this visit, and she benefits from a slower pace and repeated rest breaks as well as education for diaphragmatic breathing, and benefits from attention being drawn away from the pain she is feeling.. She demonstrates -8 degrees of knee extension this visit, and 60 degrees of flexion upon ending of session. Her vitals were elevated today due to pain and caution was taken to avoid performing exacerbating activities, and ending vitals were improved to 173/ 92 mmHg.    Licha Is progressing well towards her goals.   Pt prognosis is Good.     Pt will continue to benefit from skilled outpatient physical therapy to address the deficits listed in the problem list box on initial evaluation, provide pt/family education and to maximize pt's level of " independence in the home and community environment.     Pt's spiritual, cultural and educational needs considered and pt agreeable to plan of care and goals.     Anticipated barriers to physical therapy: None    Goals: Short Term Goals: 2 weeks   1.) Patient will demonstrate independence in compliance and technique of home exercise program provided as per teach-back method of assessment.  2.) Patient will achieve 0 degrees of active and passive knee extension so as to improve normal terminal knee extension during ambulation.  3.) Patient will achieve 90 degrees of knee flexion to demonstrate improvement in functional knee range of motion.  4.) Patient will complete 10 straight-leg raises without knee extension lag to demonstrate improved quadriceps function and endurance.  5.) Patient will demonstrate a 10% improvement as per the FOTO.     Long Term Goals: 6 weeks   1.) Patient will demonstrate independence in compliance and technique of home exercise program provided as per teach-back method of assessment.  2.) Patient will achieve and maintain 0 degrees of active and passive knee extension so as to improve normal terminal knee extension during ambulation.  3.) Patient will achieve 120 degrees of knee flexion to demonstrate improvement in functional knee range of motion.  4.) Patient will ambulate for 300 feet with LRAD and with normal gait mechanics to demonstrate improved functional mobility.  5.) Patient will demonstrate a 20% improvement as per the FOTO.  Plan     Discharge from [facility] and [facility] to outpatient facility near their home.    Rosa Hart PT, DPT  Board Certified in Orthopedic Physical Therapy

## 2023-02-21 NOTE — PROGRESS NOTES
"Licha Gonzales presents for initial post-operative visit following a left total knee arthroplasty performed by Dr. Teague on 2/15/2023. She is a [facility] patient, so she comes in one week post op for a clear to travel visit. She and her  are driving back to [place] today.     Exam:   Height 5' 5" (1.651 m), weight 97.5 kg (215 lb).   Ambulating well with assistive device.  Incision is clean and dry without drainage or erythema. No erythema and minimal bruising noted. She will continue to wear the stocking for the next week.  ROM:-5-80    Initial post-operative radiographs reviewed today revealing a well fixed and aligned prosthesis.    A/P:  1 week s/p left total knee replacement  - The patient was advised to keep the dressing clean and dry for the next 7 days after which she may wash the area with antibacterial soap in the shower. Will not submerge incision until one month post op.  - Outpatient PT: set up at home per patient. She was given a copy of the accelerated outpatient PT protocol to bring to her home PT  - Continue eliquis for 1 month post op.  - Pain medication refilled prior to patient leaving   - f/u in one week with home MD and then at 6 and 12 week intervals  - Pt will call clinic with problems/concerns.      "

## 2023-02-24 ENCOUNTER — PATIENT MESSAGE (OUTPATIENT)
Dept: RESEARCH | Facility: HOSPITAL | Age: 58
End: 2023-02-24
Payer: COMMERCIAL

## 2023-02-27 ENCOUNTER — PATIENT MESSAGE (OUTPATIENT)
Dept: ADMINISTRATIVE | Facility: OTHER | Age: 58
End: 2023-02-27
Payer: COMMERCIAL

## 2023-03-02 ENCOUNTER — PATIENT MESSAGE (OUTPATIENT)
Dept: ADMINISTRATIVE | Facility: OTHER | Age: 58
End: 2023-03-02
Payer: COMMERCIAL

## 2023-03-03 DIAGNOSIS — Z96.652 STATUS POST LEFT KNEE REPLACEMENT: ICD-10-CM

## 2023-03-03 RX ORDER — OXYCODONE HYDROCHLORIDE 5 MG/1
TABLET ORAL
Qty: 28 TABLET | Refills: 0 | Status: SHIPPED | OUTPATIENT
Start: 2023-03-03 | End: 2023-03-16 | Stop reason: SDUPTHER

## 2023-03-16 DIAGNOSIS — Z96.652 STATUS POST LEFT KNEE REPLACEMENT: ICD-10-CM

## 2023-03-16 RX ORDER — OXYCODONE HYDROCHLORIDE 5 MG/1
TABLET ORAL
Qty: 28 TABLET | Refills: 0 | Status: SHIPPED | OUTPATIENT
Start: 2023-03-16

## 2023-04-30 ENCOUNTER — PATIENT MESSAGE (OUTPATIENT)
Dept: ADMINISTRATIVE | Facility: OTHER | Age: 58
End: 2023-04-30
Payer: COMMERCIAL

## 2023-05-09 ENCOUNTER — PATIENT MESSAGE (OUTPATIENT)
Dept: ADMINISTRATIVE | Facility: OTHER | Age: 58
End: 2023-05-09
Payer: COMMERCIAL

## (undated) DEVICE — GLOVE BIOGEL PI MICRO SZ 7

## (undated) DEVICE — GOWN ECLIPSE REINF LV4 XLNG XL

## (undated) DEVICE — DRESSING AQUACEL AG RBBN 2X45

## (undated) DEVICE — BRUSH SCRUB HIBICLENS 4%

## (undated) DEVICE — SYS KNEE EPAK PIN ATTUNE
Type: IMPLANTABLE DEVICE | Site: KNEE | Status: NON-FUNCTIONAL
Removed: 2023-02-15

## (undated) DEVICE — HOOD T7 W/ PEEL AWAY LENS

## (undated) DEVICE — MARKER SKIN STND TIP BLUE BARR

## (undated) DEVICE — SOL NACL IRR 3000ML

## (undated) DEVICE — DRAPE SURG W/TWL 17 5/8X23

## (undated) DEVICE — BLADE SAGITTAL 18 X 1.27 X 90M

## (undated) DEVICE — SUT MCRYL PLUS 4-0 PS2 27IN

## (undated) DEVICE — COVER MAYO STND XL 30X57IN

## (undated) DEVICE — GOWN SMARTGOWN 3XL XLONG

## (undated) DEVICE — SYR 50CC LL

## (undated) DEVICE — DRAPE T EXTRM SURG 121X128X90

## (undated) DEVICE — SPONGE GAUZE 16PLY 4X4

## (undated) DEVICE — UNDERGLOVES BIOGEL PI SIZE 8.5

## (undated) DEVICE — BLADE DUAL CUT SAG 35X64X.89MM

## (undated) DEVICE — NDL 18GA X1 1/2 REG BEVEL

## (undated) DEVICE — SUT QUILL PDO VIOL CP 45CM 2

## (undated) DEVICE — CLOTH READYBATH STANDARD WT

## (undated) DEVICE — ELECTRODE REM PLYHSV RETURN 9

## (undated) DEVICE — ADHESIVE DERMABOND ADVANCED

## (undated) DEVICE — CONTAINER SPECIMEN OR STER 4OZ

## (undated) DEVICE — UNDERGLOVES BIOGEL PI SIZE 7.5

## (undated) DEVICE — SUT 2/0 36IN COATED VICRYL

## (undated) DEVICE — GAUZE SPONGE 4X4 12PLY

## (undated) DEVICE — DRESSING TELFA N ADH 3X8

## (undated) DEVICE — SUT 1 36IN COATED VICRYL UN

## (undated) DEVICE — KIT IRR SUCTION HND PIECE

## (undated) DEVICE — BLADE RECIP DS OFST 70X1X12.5

## (undated) DEVICE — CATH SUCTION 10FR

## (undated) DEVICE — DRESSING TRANS 4X4 TEGADERM

## (undated) DEVICE — DRAPE TOP 53X102IN

## (undated) DEVICE — KIT TOTAL KNEE TKOFG OMC

## (undated) DEVICE — ALCOHOL 70% ISOP RUBBING 4OZ

## (undated) DEVICE — DRAPE INCISE IOBAN 2 23X33IN

## (undated) DEVICE — PUMP COLD THERAPY

## (undated) DEVICE — SYS REVOLUTION CEMENT MIXING

## (undated) DEVICE — SOL BETADINE 5%

## (undated) DEVICE — TOWEL OR XRAY WHITE 17X26IN

## (undated) DEVICE — TAPE SILK 3IN

## (undated) DEVICE — GLOVE BIOGEL SKINSENSE PI 8.0